# Patient Record
Sex: MALE | Race: WHITE | URBAN - METROPOLITAN AREA
[De-identification: names, ages, dates, MRNs, and addresses within clinical notes are randomized per-mention and may not be internally consistent; named-entity substitution may affect disease eponyms.]

---

## 2023-08-21 ENCOUNTER — OFFICE VISIT (OUTPATIENT)
Dept: FAMILY MEDICINE CLINIC | Facility: CLINIC | Age: 33
End: 2023-08-21
Payer: COMMERCIAL

## 2023-08-21 VITALS
RESPIRATION RATE: 18 BRPM | HEIGHT: 74 IN | DIASTOLIC BLOOD PRESSURE: 86 MMHG | WEIGHT: 315 LBS | OXYGEN SATURATION: 98 % | HEART RATE: 93 BPM | SYSTOLIC BLOOD PRESSURE: 146 MMHG | BODY MASS INDEX: 40.43 KG/M2

## 2023-08-21 DIAGNOSIS — F90.2 ATTENTION DEFICIT HYPERACTIVITY DISORDER (ADHD), COMBINED TYPE: ICD-10-CM

## 2023-08-21 DIAGNOSIS — E66.01 MORBID OBESITY (HCC): ICD-10-CM

## 2023-08-21 DIAGNOSIS — I10 PRIMARY HYPERTENSION: Primary | ICD-10-CM

## 2023-08-21 DIAGNOSIS — Z23 NEED FOR DIPHTHERIA-TETANUS-PERTUSSIS (TDAP) VACCINE: ICD-10-CM

## 2023-08-21 DIAGNOSIS — F33.40 RECURRENT MAJOR DEPRESSIVE DISORDER, IN REMISSION (HCC): ICD-10-CM

## 2023-08-21 DIAGNOSIS — G47.33 OSA (OBSTRUCTIVE SLEEP APNEA): ICD-10-CM

## 2023-08-21 DIAGNOSIS — Z00.00 LABORATORY EXAMINATION ORDERED AS PART OF A ROUTINE GENERAL MEDICAL EXAMINATION: ICD-10-CM

## 2023-08-21 DIAGNOSIS — F41.9 ANXIETY: ICD-10-CM

## 2023-08-21 DIAGNOSIS — J45.20 MILD INTERMITTENT ASTHMA WITHOUT COMPLICATION: ICD-10-CM

## 2023-08-21 PROCEDURE — 3008F BODY MASS INDEX DOCD: CPT | Performed by: FAMILY MEDICINE

## 2023-08-21 PROCEDURE — 3077F SYST BP >= 140 MM HG: CPT | Performed by: FAMILY MEDICINE

## 2023-08-21 PROCEDURE — 90471 IMMUNIZATION ADMIN: CPT | Performed by: FAMILY MEDICINE

## 2023-08-21 PROCEDURE — 90715 TDAP VACCINE 7 YRS/> IM: CPT | Performed by: FAMILY MEDICINE

## 2023-08-21 PROCEDURE — 3079F DIAST BP 80-89 MM HG: CPT | Performed by: FAMILY MEDICINE

## 2023-08-21 PROCEDURE — 99203 OFFICE O/P NEW LOW 30 MIN: CPT | Performed by: FAMILY MEDICINE

## 2023-08-21 RX ORDER — METHYLPHENIDATE HYDROCHLORIDE 20 MG/1
1 CAPSULE, EXTENDED RELEASE ORAL DAILY
COMMUNITY

## 2023-08-21 RX ORDER — NAPROXEN 500 MG/1
500 TABLET ORAL AS NEEDED
COMMUNITY
Start: 2023-02-07

## 2023-08-21 RX ORDER — BUPROPION HYDROCHLORIDE 200 MG/1
200 TABLET, EXTENDED RELEASE ORAL 2 TIMES DAILY
COMMUNITY

## 2023-08-21 RX ORDER — ASCORBIC ACID 125 MG
125 TABLET,CHEWABLE ORAL NIGHTLY
COMMUNITY
Start: 2021-12-01

## 2023-08-21 RX ORDER — HYDROXYZINE HYDROCHLORIDE 25 MG/1
25 TABLET, FILM COATED ORAL AS NEEDED
COMMUNITY

## 2023-08-21 RX ORDER — LORATADINE 10 MG/1
10 TABLET ORAL DAILY
COMMUNITY

## 2023-08-21 RX ORDER — ARIPIPRAZOLE 2 MG/1
2 TABLET ORAL DAILY
COMMUNITY
Start: 2023-06-01

## 2023-08-21 RX ORDER — ELECTROLYTES/DEXTROSE
1 SOLUTION, ORAL ORAL DAILY
COMMUNITY

## 2023-08-21 RX ORDER — BUSPIRONE HYDROCHLORIDE 10 MG/1
2 TABLET ORAL 3 TIMES DAILY
COMMUNITY
Start: 2023-06-09 | End: 2023-08-22

## 2023-08-21 RX ORDER — ALBUTEROL SULFATE 90 UG/1
2 AEROSOL, METERED RESPIRATORY (INHALATION) EVERY 4 HOURS PRN
COMMUNITY
Start: 2022-12-17

## 2023-08-21 RX ORDER — HYDROCHLOROTHIAZIDE 25 MG/1
25 TABLET ORAL DAILY
COMMUNITY

## 2023-09-14 ENCOUNTER — LAB ENCOUNTER (OUTPATIENT)
Dept: LAB | Age: 33
End: 2023-09-14
Attending: FAMILY MEDICINE
Payer: COMMERCIAL

## 2023-09-14 DIAGNOSIS — E66.01 MORBID OBESITY (HCC): ICD-10-CM

## 2023-09-14 DIAGNOSIS — Z00.00 LABORATORY EXAMINATION ORDERED AS PART OF A ROUTINE GENERAL MEDICAL EXAMINATION: ICD-10-CM

## 2023-09-14 LAB
ALBUMIN SERPL-MCNC: 3.4 G/DL (ref 3.4–5)
ALBUMIN/GLOB SERPL: 0.9 {RATIO} (ref 1–2)
ALP LIVER SERPL-CCNC: 53 U/L
ALT SERPL-CCNC: 97 U/L
ANION GAP SERPL CALC-SCNC: 8 MMOL/L (ref 0–18)
AST SERPL-CCNC: 42 U/L (ref 15–37)
BASOPHILS # BLD AUTO: 0.06 X10(3) UL (ref 0–0.2)
BASOPHILS NFR BLD AUTO: 1.1 %
BILIRUB SERPL-MCNC: 0.7 MG/DL (ref 0.1–2)
BUN BLD-MCNC: 19 MG/DL (ref 7–18)
CALCIUM BLD-MCNC: 9.2 MG/DL (ref 8.5–10.1)
CHLORIDE SERPL-SCNC: 106 MMOL/L (ref 98–112)
CHOLEST SERPL-MCNC: 201 MG/DL (ref ?–200)
CO2 SERPL-SCNC: 26 MMOL/L (ref 21–32)
CREAT BLD-MCNC: 1.22 MG/DL
EGFRCR SERPLBLD CKD-EPI 2021: 80 ML/MIN/1.73M2 (ref 60–?)
EOSINOPHIL # BLD AUTO: 0.25 X10(3) UL (ref 0–0.7)
EOSINOPHIL NFR BLD AUTO: 4.7 %
ERYTHROCYTE [DISTWIDTH] IN BLOOD BY AUTOMATED COUNT: 13.3 %
EST. AVERAGE GLUCOSE BLD GHB EST-MCNC: 105 MG/DL (ref 68–126)
FASTING PATIENT LIPID ANSWER: YES
FASTING STATUS PATIENT QL REPORTED: YES
GLOBULIN PLAS-MCNC: 4 G/DL (ref 2.8–4.4)
GLUCOSE BLD-MCNC: 100 MG/DL (ref 70–99)
HBA1C MFR BLD: 5.3 % (ref ?–5.7)
HCT VFR BLD AUTO: 46.3 %
HDLC SERPL-MCNC: 42 MG/DL (ref 40–59)
HGB BLD-MCNC: 15.3 G/DL
IMM GRANULOCYTES # BLD AUTO: 0.01 X10(3) UL (ref 0–1)
IMM GRANULOCYTES NFR BLD: 0.2 %
LDLC SERPL CALC-MCNC: 118 MG/DL (ref ?–100)
LYMPHOCYTES # BLD AUTO: 1.3 X10(3) UL (ref 1–4)
LYMPHOCYTES NFR BLD AUTO: 24.4 %
MCH RBC QN AUTO: 30.1 PG (ref 26–34)
MCHC RBC AUTO-ENTMCNC: 33 G/DL (ref 31–37)
MCV RBC AUTO: 91 FL
MONOCYTES # BLD AUTO: 0.43 X10(3) UL (ref 0.1–1)
MONOCYTES NFR BLD AUTO: 8.1 %
NEUTROPHILS # BLD AUTO: 3.28 X10 (3) UL (ref 1.5–7.7)
NEUTROPHILS # BLD AUTO: 3.28 X10(3) UL (ref 1.5–7.7)
NEUTROPHILS NFR BLD AUTO: 61.5 %
NONHDLC SERPL-MCNC: 159 MG/DL (ref ?–130)
OSMOLALITY SERPL CALC.SUM OF ELEC: 292 MOSM/KG (ref 275–295)
PLATELET # BLD AUTO: 229 10(3)UL (ref 150–450)
POTASSIUM SERPL-SCNC: 3.7 MMOL/L (ref 3.5–5.1)
PROT SERPL-MCNC: 7.4 G/DL (ref 6.4–8.2)
RBC # BLD AUTO: 5.09 X10(6)UL
SODIUM SERPL-SCNC: 140 MMOL/L (ref 136–145)
TRIGL SERPL-MCNC: 234 MG/DL (ref 30–149)
TSI SER-ACNC: 2.08 MIU/ML (ref 0.36–3.74)
VLDLC SERPL CALC-MCNC: 41 MG/DL (ref 0–30)
WBC # BLD AUTO: 5.3 X10(3) UL (ref 4–11)

## 2023-09-14 PROCEDURE — 36415 COLL VENOUS BLD VENIPUNCTURE: CPT

## 2023-09-14 PROCEDURE — 80053 COMPREHEN METABOLIC PANEL: CPT

## 2023-09-14 PROCEDURE — 80061 LIPID PANEL: CPT

## 2023-09-14 PROCEDURE — 83036 HEMOGLOBIN GLYCOSYLATED A1C: CPT

## 2023-09-14 PROCEDURE — 85025 COMPLETE CBC W/AUTO DIFF WBC: CPT

## 2023-09-14 PROCEDURE — 84443 ASSAY THYROID STIM HORMONE: CPT

## 2023-09-18 ENCOUNTER — OFFICE VISIT (OUTPATIENT)
Dept: FAMILY MEDICINE CLINIC | Facility: CLINIC | Age: 33
End: 2023-09-18
Payer: COMMERCIAL

## 2023-09-18 VITALS
HEIGHT: 74 IN | DIASTOLIC BLOOD PRESSURE: 90 MMHG | RESPIRATION RATE: 20 BRPM | HEART RATE: 102 BPM | OXYGEN SATURATION: 98 % | SYSTOLIC BLOOD PRESSURE: 152 MMHG | WEIGHT: 315 LBS | BODY MASS INDEX: 40.43 KG/M2

## 2023-09-18 DIAGNOSIS — K76.0 FATTY LIVER DISEASE, NONALCOHOLIC: ICD-10-CM

## 2023-09-18 DIAGNOSIS — Z23 NEED FOR VACCINATION: ICD-10-CM

## 2023-09-18 DIAGNOSIS — I10 ESSENTIAL HYPERTENSION: Primary | ICD-10-CM

## 2023-09-18 DIAGNOSIS — E78.2 MIXED HYPERLIPIDEMIA: ICD-10-CM

## 2023-09-18 PROCEDURE — 99214 OFFICE O/P EST MOD 30 MIN: CPT | Performed by: FAMILY MEDICINE

## 2023-09-18 PROCEDURE — 90471 IMMUNIZATION ADMIN: CPT | Performed by: FAMILY MEDICINE

## 2023-09-18 PROCEDURE — 90686 IIV4 VACC NO PRSV 0.5 ML IM: CPT | Performed by: FAMILY MEDICINE

## 2023-09-18 PROCEDURE — 3080F DIAST BP >= 90 MM HG: CPT | Performed by: FAMILY MEDICINE

## 2023-09-18 PROCEDURE — 3077F SYST BP >= 140 MM HG: CPT | Performed by: FAMILY MEDICINE

## 2023-09-18 PROCEDURE — 3008F BODY MASS INDEX DOCD: CPT | Performed by: FAMILY MEDICINE

## 2023-09-18 RX ORDER — LISINOPRIL AND HYDROCHLOROTHIAZIDE 12.5; 1 MG/1; MG/1
1 TABLET ORAL DAILY
Qty: 30 TABLET | Refills: 2 | Status: SHIPPED | OUTPATIENT
Start: 2023-09-18

## 2023-09-20 RX ORDER — BUPROPION HYDROCHLORIDE 200 MG/1
200 TABLET, EXTENDED RELEASE ORAL 2 TIMES DAILY
Qty: 60 TABLET | Refills: 5 | Status: SHIPPED | OUTPATIENT
Start: 2023-09-20

## 2023-09-28 ENCOUNTER — OFFICE VISIT (OUTPATIENT)
Dept: INTERNAL MEDICINE CLINIC | Facility: CLINIC | Age: 33
End: 2023-09-28
Payer: COMMERCIAL

## 2023-09-28 DIAGNOSIS — F41.9 ANXIETY AND DEPRESSION: ICD-10-CM

## 2023-09-28 DIAGNOSIS — G47.33 OSA (OBSTRUCTIVE SLEEP APNEA): ICD-10-CM

## 2023-09-28 DIAGNOSIS — E66.01 CLASS 3 SEVERE OBESITY WITH SERIOUS COMORBIDITY AND BODY MASS INDEX (BMI) OF 50.0 TO 59.9 IN ADULT, UNSPECIFIED OBESITY TYPE (HCC): ICD-10-CM

## 2023-09-28 DIAGNOSIS — R63.2 BINGE EATING: ICD-10-CM

## 2023-09-28 DIAGNOSIS — I10 PRIMARY HYPERTENSION: ICD-10-CM

## 2023-09-28 DIAGNOSIS — E88.819 INSULIN RESISTANCE: ICD-10-CM

## 2023-09-28 DIAGNOSIS — F32.A ANXIETY AND DEPRESSION: ICD-10-CM

## 2023-09-28 DIAGNOSIS — Z51.81 ENCOUNTER FOR THERAPEUTIC DRUG MONITORING: Primary | ICD-10-CM

## 2023-09-28 PROCEDURE — 3008F BODY MASS INDEX DOCD: CPT | Performed by: NURSE PRACTITIONER

## 2023-09-28 PROCEDURE — 99204 OFFICE O/P NEW MOD 45 MIN: CPT | Performed by: NURSE PRACTITIONER

## 2023-09-28 PROCEDURE — 3080F DIAST BP >= 90 MM HG: CPT | Performed by: NURSE PRACTITIONER

## 2023-09-28 PROCEDURE — 3077F SYST BP >= 140 MM HG: CPT | Performed by: NURSE PRACTITIONER

## 2023-09-28 RX ORDER — NALTREXONE HYDROCHLORIDE 50 MG/1
25 TABLET, FILM COATED ORAL
Qty: 15 TABLET | Refills: 2 | Status: SHIPPED | OUTPATIENT
Start: 2023-09-28

## 2023-09-28 RX ORDER — METFORMIN HYDROCHLORIDE 750 MG/1
1500 TABLET, EXTENDED RELEASE ORAL
Qty: 60 TABLET | Refills: 2 | Status: SHIPPED | OUTPATIENT
Start: 2023-09-28

## 2023-09-30 VITALS
SYSTOLIC BLOOD PRESSURE: 142 MMHG | HEART RATE: 90 BPM | DIASTOLIC BLOOD PRESSURE: 90 MMHG | WEIGHT: 315 LBS | BODY MASS INDEX: 40 KG/M2 | HEIGHT: 74.5 IN | RESPIRATION RATE: 20 BRPM

## 2023-09-30 PROBLEM — E66.813 CLASS 3 SEVERE OBESITY WITH SERIOUS COMORBIDITY AND BODY MASS INDEX (BMI) OF 50.0 TO 59.9 IN ADULT: Status: ACTIVE | Noted: 2023-09-30

## 2023-09-30 PROBLEM — E66.813 CLASS 3 SEVERE OBESITY WITH SERIOUS COMORBIDITY AND BODY MASS INDEX (BMI) OF 50.0 TO 59.9 IN ADULT (HCC): Status: ACTIVE | Noted: 2023-09-30

## 2023-09-30 PROBLEM — E66.01 CLASS 3 SEVERE OBESITY WITH SERIOUS COMORBIDITY AND BODY MASS INDEX (BMI) OF 50.0 TO 59.9 IN ADULT (HCC): Status: ACTIVE | Noted: 2023-09-30

## 2023-09-30 PROBLEM — Z51.81 ENCOUNTER FOR THERAPEUTIC DRUG MONITORING: Status: ACTIVE | Noted: 2023-09-30

## 2023-10-12 ENCOUNTER — OFFICE VISIT (OUTPATIENT)
Dept: INTERNAL MEDICINE CLINIC | Facility: CLINIC | Age: 33
End: 2023-10-12
Payer: COMMERCIAL

## 2023-10-12 DIAGNOSIS — F41.9 ANXIETY AND DEPRESSION: ICD-10-CM

## 2023-10-12 DIAGNOSIS — G47.33 OSA (OBSTRUCTIVE SLEEP APNEA): ICD-10-CM

## 2023-10-12 DIAGNOSIS — I10 PRIMARY HYPERTENSION: ICD-10-CM

## 2023-10-12 DIAGNOSIS — F32.A ANXIETY AND DEPRESSION: ICD-10-CM

## 2023-10-12 DIAGNOSIS — E66.01 CLASS 3 SEVERE OBESITY WITH SERIOUS COMORBIDITY AND BODY MASS INDEX (BMI) OF 50.0 TO 59.9 IN ADULT, UNSPECIFIED OBESITY TYPE (HCC): Primary | ICD-10-CM

## 2023-10-12 DIAGNOSIS — E88.819 INSULIN RESISTANCE: ICD-10-CM

## 2023-10-12 DIAGNOSIS — R63.2 BINGE EATING: ICD-10-CM

## 2023-10-12 PROCEDURE — 97802 MEDICAL NUTRITION INDIV IN: CPT

## 2023-10-16 ENCOUNTER — LAB ENCOUNTER (OUTPATIENT)
Dept: LAB | Age: 33
End: 2023-10-16
Attending: NURSE PRACTITIONER
Payer: COMMERCIAL

## 2023-10-16 ENCOUNTER — NURSE ONLY (OUTPATIENT)
Dept: FAMILY MEDICINE CLINIC | Facility: CLINIC | Age: 33
End: 2023-10-16
Payer: COMMERCIAL

## 2023-10-16 VITALS — SYSTOLIC BLOOD PRESSURE: 140 MMHG | DIASTOLIC BLOOD PRESSURE: 88 MMHG

## 2023-10-16 DIAGNOSIS — F41.9 ANXIETY AND DEPRESSION: ICD-10-CM

## 2023-10-16 DIAGNOSIS — I10 ESSENTIAL HYPERTENSION: Primary | ICD-10-CM

## 2023-10-16 DIAGNOSIS — R63.2 BINGE EATING: ICD-10-CM

## 2023-10-16 DIAGNOSIS — Z51.81 ENCOUNTER FOR THERAPEUTIC DRUG MONITORING: ICD-10-CM

## 2023-10-16 DIAGNOSIS — F32.A ANXIETY AND DEPRESSION: ICD-10-CM

## 2023-10-16 DIAGNOSIS — E88.819 INSULIN RESISTANCE: ICD-10-CM

## 2023-10-16 DIAGNOSIS — E66.01 CLASS 3 SEVERE OBESITY WITH SERIOUS COMORBIDITY AND BODY MASS INDEX (BMI) OF 50.0 TO 59.9 IN ADULT, UNSPECIFIED OBESITY TYPE (HCC): ICD-10-CM

## 2023-10-16 DIAGNOSIS — G47.33 OSA (OBSTRUCTIVE SLEEP APNEA): ICD-10-CM

## 2023-10-16 DIAGNOSIS — I10 PRIMARY HYPERTENSION: ICD-10-CM

## 2023-10-16 LAB
VIT B12 SERPL-MCNC: 493 PG/ML (ref 193–986)
VIT D+METAB SERPL-MCNC: 31.7 NG/ML (ref 30–100)

## 2023-10-16 PROCEDURE — 82607 VITAMIN B-12: CPT

## 2023-10-16 PROCEDURE — 36415 COLL VENOUS BLD VENIPUNCTURE: CPT

## 2023-10-16 PROCEDURE — 82306 VITAMIN D 25 HYDROXY: CPT

## 2023-10-16 RX ORDER — LISINOPRIL AND HYDROCHLOROTHIAZIDE 20; 12.5 MG/1; MG/1
1 TABLET ORAL DAILY
Qty: 30 TABLET | Refills: 2 | Status: SHIPPED | OUTPATIENT
Start: 2023-10-16

## 2023-10-16 NOTE — PROGRESS NOTES
Presents for nurse visit BP recheck. BP not at goal, continues to run high at home with elevated readings 140s/80s. Will titrate up anithypertensive. F/u 1mo.

## 2023-11-15 ENCOUNTER — OFFICE VISIT (OUTPATIENT)
Dept: FAMILY MEDICINE CLINIC | Facility: CLINIC | Age: 33
End: 2023-11-15
Payer: COMMERCIAL

## 2023-11-15 VITALS
RESPIRATION RATE: 18 BRPM | DIASTOLIC BLOOD PRESSURE: 84 MMHG | HEART RATE: 94 BPM | BODY MASS INDEX: 40 KG/M2 | SYSTOLIC BLOOD PRESSURE: 134 MMHG | OXYGEN SATURATION: 95 % | WEIGHT: 315 LBS | HEIGHT: 74.5 IN

## 2023-11-15 DIAGNOSIS — E66.01 CLASS 3 SEVERE OBESITY DUE TO EXCESS CALORIES WITH SERIOUS COMORBIDITY AND BODY MASS INDEX (BMI) OF 50.0 TO 59.9 IN ADULT (HCC): ICD-10-CM

## 2023-11-15 DIAGNOSIS — G47.33 OSA (OBSTRUCTIVE SLEEP APNEA): ICD-10-CM

## 2023-11-15 DIAGNOSIS — Z23 NEED FOR PNEUMOCOCCAL VACCINATION: ICD-10-CM

## 2023-11-15 DIAGNOSIS — I10 ESSENTIAL HYPERTENSION: ICD-10-CM

## 2023-11-15 DIAGNOSIS — Z00.00 WELLNESS EXAMINATION: Primary | ICD-10-CM

## 2023-11-15 DIAGNOSIS — F41.9 ANXIETY: ICD-10-CM

## 2023-11-15 PROBLEM — R79.89 ELEVATED LFTS: Status: ACTIVE | Noted: 2023-11-15

## 2023-11-15 PROCEDURE — 3079F DIAST BP 80-89 MM HG: CPT | Performed by: FAMILY MEDICINE

## 2023-11-15 PROCEDURE — 3008F BODY MASS INDEX DOCD: CPT | Performed by: FAMILY MEDICINE

## 2023-11-15 PROCEDURE — 90677 PCV20 VACCINE IM: CPT | Performed by: FAMILY MEDICINE

## 2023-11-15 PROCEDURE — 90471 IMMUNIZATION ADMIN: CPT | Performed by: FAMILY MEDICINE

## 2023-11-15 PROCEDURE — 99395 PREV VISIT EST AGE 18-39: CPT | Performed by: FAMILY MEDICINE

## 2023-11-15 PROCEDURE — 3075F SYST BP GE 130 - 139MM HG: CPT | Performed by: FAMILY MEDICINE

## 2023-11-15 RX ORDER — LISINOPRIL AND HYDROCHLOROTHIAZIDE 20; 12.5 MG/1; MG/1
1 TABLET ORAL DAILY
Qty: 90 TABLET | Refills: 1 | Status: SHIPPED | OUTPATIENT
Start: 2023-11-15

## 2024-01-07 DIAGNOSIS — E66.01 CLASS 3 SEVERE OBESITY WITH SERIOUS COMORBIDITY AND BODY MASS INDEX (BMI) OF 50.0 TO 59.9 IN ADULT, UNSPECIFIED OBESITY TYPE (HCC): ICD-10-CM

## 2024-01-07 DIAGNOSIS — Z51.81 ENCOUNTER FOR THERAPEUTIC DRUG MONITORING: ICD-10-CM

## 2024-01-07 RX ORDER — NALTREXONE HYDROCHLORIDE 50 MG/1
25 TABLET, FILM COATED ORAL
Qty: 15 TABLET | Refills: 2 | Status: SHIPPED | OUTPATIENT
Start: 2024-01-07

## 2024-01-07 NOTE — TELEPHONE ENCOUNTER
Requesting Naltrexone  LOV: 9/28/23  RTC: 2 months  Last Relevant Labs: na  Filled: 9/28/23 #15 with 2 refills    Future Appointments   Date Time Provider Department Center   2/19/2024  3:20 PM Monique Nguyen APRN [id number]   5/15/2024  8:20 AM Arben Mckinney MD [facility]

## 2024-02-01 ENCOUNTER — OFFICE VISIT (OUTPATIENT)
Dept: FAMILY MEDICINE CLINIC | Facility: CLINIC | Age: 34
End: 2024-02-01
Payer: COMMERCIAL

## 2024-02-01 VITALS
OXYGEN SATURATION: 98 % | TEMPERATURE: 98 F | WEIGHT: 315 LBS | DIASTOLIC BLOOD PRESSURE: 83 MMHG | RESPIRATION RATE: 20 BRPM | HEART RATE: 97 BPM | HEIGHT: 74.5 IN | SYSTOLIC BLOOD PRESSURE: 128 MMHG | BODY MASS INDEX: 40 KG/M2

## 2024-02-01 DIAGNOSIS — J01.40 ACUTE NON-RECURRENT PANSINUSITIS: Primary | ICD-10-CM

## 2024-02-01 PROCEDURE — 99213 OFFICE O/P EST LOW 20 MIN: CPT | Performed by: NURSE PRACTITIONER

## 2024-02-01 RX ORDER — DOXYCYCLINE HYCLATE 100 MG
100 TABLET ORAL 2 TIMES DAILY
Qty: 14 TABLET | Refills: 0 | Status: SHIPPED | OUTPATIENT
Start: 2024-02-01 | End: 2024-02-08

## 2024-02-01 NOTE — PROGRESS NOTES
CHIEF COMPLAINT:     Chief Complaint   Patient presents with    Cold     Sore throat, congestion, body aches, fatigue, sinus pressure and pain, x10 days        HPI:   Lakhwinder Ma is a 33 year old male who presents for cold symptoms for  10  days. Symptoms have progressed into sinus congestion and been worsening since onset. Sinus congestion/pain is described as a pressure and is located mainly in sinuses.  Patient also reports sore throat, congestion, sinus pain, prior history of sinusitis. denies dental pain. Has treated symptoms with Mucinex.       Current Outpatient Medications   Medication Sig Dispense Refill    Doxycycline Hyclate 100 MG Oral Tab Take 1 tablet (100 mg total) by mouth 2 (two) times daily for 7 days. 14 tablet 0    naltrexone 50 MG Oral Tab Take 0.5 tablets (25 mg total) by mouth daily with food. For cravings 15 tablet 2    Omega-3 Fatty Acids (FISH OIL OR) Take 1 capsule by mouth daily.      lisinopril-hydroCHLOROthiazide 20-12.5 MG Oral Tab Take 1 tablet by mouth daily. 90 tablet 1    buPROPion HCl ER, SR, 200 MG Oral Tablet 12 Hr Take 1 tablet (200 mg total) by mouth 2 (two) times daily. 60 tablet 5    busPIRone 10 MG Oral Tab Take 2 tablets (20 mg total) by mouth 3 (three) times daily. 180 tablet 5    ARIPiprazole 2 MG Oral Tab Take 1 tablet (2 mg total) by mouth daily.      hydrOXYzine 25 MG Oral Tab Take 1 tablet (25 mg total) by mouth as needed for Anxiety.      Magnesium Citrate 125 MG Oral Cap Take 1 capsule by mouth nightly.      naproxen 500 MG Oral Tab Take 1 tablet (500 mg total) by mouth as needed.      albuterol 108 (90 Base) MCG/ACT Inhalation Aero Soln Inhale 2 puffs into the lungs every 4 (four) hours as needed for Wheezing or Shortness of Breath.      loratadine 10 MG Oral Tab Take 1 tablet (10 mg total) by mouth daily.      Multiple Vitamin (MULTIVITAMIN ADULT) Oral Tab Take 1 tablet by mouth daily.        Past Medical History:   Diagnosis Date    Anxiety     Asthma      Depression     Hypertension     Restless leg syndrome       No past surgical history on file.   Family History   Problem Relation Age of Onset    Hypertension Father     Hypertension Brother       Social History     Socioeconomic History    Marital status:    Tobacco Use    Smoking status: Never    Smokeless tobacco: Never   Vaping Use    Vaping Use: Never used   Substance and Sexual Activity    Alcohol use: Yes     Comment: rare    Drug use: Never         REVIEW OF SYSTEMS:   GENERAL:  denies  diminished appetite  SKIN: no rashes or abnormal skin lesions  HEENT: See HPI.    LUNGS: denies shortness of breath or wheezing, See HPI  CARDIOVASCULAR: denies chest pain or palpitations   GI: denies N/V/C or abdominal pain  NEURO: + sinus headaches.  No numbness or tingling in face.    EXAM:   /83   Pulse 97   Temp 98.2 °F (36.8 °C) (Oral)   Resp 20   Ht 6' 2.5\" (1.892 m)   Wt (!) 387 lb (175.5 kg)   SpO2 98%   BMI 49.02 kg/m²   GENERAL: well developed, well nourished,in no apparent distress  SKIN: no rashes,no suspicious lesions  HEAD: atraumatic, normocephalic, + tenderness on palpation of sinuses  EYES: conjunctiva clear, EOM intact  EARS: TM's without erythema, no bulging, no retraction, no fluid, bony landmarks visible  NOSE: nostrils patent, clear nasal mucous, nasal mucosa reddened and inflamed  THROAT: oral mucosa pink, moist. No visible dental caries. Posterior pharynx is not erythematous.  NECK: supple, non-tender  LUNGS: Breathing is non labored. Lungs clear to auscultation bilaterally, no wheezes or rhonchi.   CARDIO: RRR without murmur  EXTREMITIES: no cyanosis, clubbing or edema  LYMPH:  no cervical lymphadenopathy.        ASSESSMENT AND PLAN:   Lakhwinder Ma is a 33 year old male who presents with URI symptoms that are consistent with:      ASSESSMENT:  Encounter Diagnosis   Name Primary?    Acute non-recurrent pansinusitis Yes         PLAN: Meds as below.  Comfort care instructions as  listed in Patient Instructions    Meds & Refills for this Visit:  Requested Prescriptions     Signed Prescriptions Disp Refills    Doxycycline Hyclate 100 MG Oral Tab 14 tablet 0     Sig: Take 1 tablet (100 mg total) by mouth 2 (two) times daily for 7 days.       Risks, benefits, side effects of medication addressed and explained.    Patient Instructions   I recommend the 4 H's for inflammation:    1. Heat (warm mist from the shower or warm liquids such as tea)  2. Honey (mixed in your tea or by the spoonful [if you are not diabetic; over the age of 1 year]--take a spoonful 3 times a day and don't eat or drink anything for 15-20 minutes)  3. Humidity--cool mist in the bedroom at night  4. Hydration --at least 8 -10 glasses a day     Flonase daily  Benadryl at night    The patient indicates understanding of these issues and agrees to the plan.  The patient is asked to f/u with PCP if sx's persist or worsen.

## 2024-02-05 RX ORDER — BUPROPION HYDROCHLORIDE 200 MG/1
200 TABLET, EXTENDED RELEASE ORAL 2 TIMES DAILY
Qty: 90 TABLET | Refills: 1 | Status: SHIPPED | OUTPATIENT
Start: 2024-02-05

## 2024-02-05 NOTE — TELEPHONE ENCOUNTER
LF 9/20/2023 with 60 tabs + 5 refills   LOV 11/15/2023  RTC 6 months     Pt requesting 90 day supply per insurance coverage, changed to 90+ 1

## 2024-02-18 NOTE — PROGRESS NOTES
Lakhwinder Ma is a 33 year old male presents today for follow-up on medical weight loss program for the treatment of overweight, obesity, or morbid obesity with associated HTN, DAISHA.    S:  Current weight   Wt Readings from Last 6 Encounters:   02/19/24 (!) 395 lb (179.2 kg)   02/01/24 (!) 387 lb (175.5 kg)   11/15/23 (!) 395 lb (179.2 kg)   09/28/23 (!) 419 lb (190.1 kg)   09/18/23 (!) 417 lb (189.1 kg)   08/21/23 (!) 406 lb (184.2 kg)    AND BMI Body mass index is 50.04 kg/m²..    Patient has lost 24# since LOV 5 month ago. He has been consistent with Naltrexone but stopped Metformin ER d/t SEs of nausea. Feeling less cravings and binge eating is rare. Has more awareness of food portions. Would like to go up on dose if appropriate d/t a slight decline in this craving control then when he started.    Testing/consult completed since LOV: Dietician: yes, note reviewed in EMR. 10/2023 with Lesvia: Estimated caloric needs for weight loss: ~2700 cals/d for 1-2 pounds/week weight loss.  Labs: yes, reviewed in EMR.    Labs: WNL    [facility] Medical Weight Loss Follow Up    Question 2/18/2024  4:07 PM CST - Filed by Patient   Please describe a success moment: I definitely can tell that the naltrexone helps eith cravings and the urge to graze. I've also tracked my food for 125 days straight   Please describe a challenging moment/needs for improvement: I still have had some moments of binge eating. And I need to focus more on getting my calories at/under my limits   Please complete this 24 hour food journal, listing everything you had to eat in the past day. Include the average time of day you ate these meals at    List foods, qty and prep for breakfast: Two homemade sausage breakfast burritos, 8 AM   List foods, qty and prep for lunch. Homemade Cajun rice & sausage, 2 cups, 12:30   List foods, qty and prep for dinner. Frozen pizza, half   List foods, qty and prep for snacks. Microwave popcorn, cinnamon raisin bagel & nutella    List the types and qty of fluids consumed 2 cans diet Pepsi, 8 cups water   On average, how many meals did you eat out per week? 5   Exercise    How many days per week are you active or exercise 5   On average, how many days were anaerobic (strength/resistance) exercises performed? 0   On average, how many days were aerobic (cardio) exercises performed? 5   Perceived level of exertion on a scale of 1-5, with 5 being very intense: 2   Stress    Average stress level on a scale of 1-10, with 10 being extremely stressed: 7   If greater than 5/1O how would you grade your coping mechanisms? moderate   Sleep hours and integrity    How many hours of uninterrupted sleep do you get a night: 7   Do you feel rested in the morning: Yes   If no, what may have been disrupting your sleep?    Please list any goal(s) for your next visit      Social hx and PMH reviewed. Employed from home.  with school age son.    REVIEW OF SYSTEMS:  GENERAL: feels well otherwise  LUNGS: denies shortness of breath with exertion  CARDIOVASCULAR: denies chest pain on exertion, denies palpitations or pedal edema  GI: denies abdominal pain.  No N/V/D/C  MUSCULOSKELETAL: no acute joint or muscle pain  NEURO: denies headaches  PSYCH: denies change in behavior or mood, denies feeling sad or depressed. Answers submitted by the patient for this visit:  Medical Weight Loss Follow Up (Submitted on 2/18/2024)  If greater than 5/1O how would you grade your coping mechanisms?: moderate      EXAM:  /84   Pulse 88   Resp 16   Ht 6' 2.5\" (1.892 m)   Wt (!) 395 lb (179.2 kg)   BMI 50.04 kg/m²  Baseline BC obtained.  GENERAL: well developed, well nourished, in no apparent distress, morbidly obese  EYES: conjunctiva pink, sclera non icteric, PERRLA  LUNGS: CTA in all fields, breathing non labored  CARDIO: RRR without murmur, normal S1 and S2 without clicks or gallops, no pedal edema.  GI: +BS  NEURO/MS: motor and sensory grossly intact  PSYCH:  pleasant, cooperative, normal mood and affect    ASSESSMENT AND PLAN:  Reviewed Initial Weight Data and Goal Weight Loss:       Encounter Diagnoses   Name Primary?    Encounter for therapeutic drug monitoring Yes    Class 3 severe obesity with serious comorbidity and body mass index (BMI) of 50.0 to 59.9 in adult, unspecified obesity type (HCC)     Primary hypertension     Binge eating     DAISHA on CPAP        No orders of the defined types were placed in this encounter.      Meds & Refills for this Visit:  Requested Prescriptions     Signed Prescriptions Disp Refills    naltrexone 50 MG Oral Tab 90 tablet 0     Sig: Take 1 tablet (50 mg total) by mouth daily with food. For cravings       Imaging & Consults:  None      Plan:  Patient has lost 24# since LOV 5 month ago on Naltrexone 25 mg daily with a total weight loss of 24# since initial consult on 9/28/23 with initial weight of 419#.  Labs reviewed. Weight loss goal: lose 303 in 6 months and 60# in 1 year.  Increase naltrexone. Hx of no AOM coverage in the past. Metformin ER with SEs of nausea.  on Food 4 Fuel and balanced plate eating, meal prep/planning. See patient instructions below for additional plans and patient counseling.      Patient Instructions   Continue making lifestyle changes that focus on good nutrition, regular exercise and stress management.    Medication Plan: Increase Naltrexone to full tab daily. Look into coverage for Zepbound.    Next steps to work on before next office visit include: Reviewed labs, Vitamin D borderline normal. Can add over the counter Vitamin D 1000 international units daily for supplementation.    Re-thinking Nutrition: Learning to See Food as Fuel  October 8, 2019  Posted in Blog, Nutrition  By [employer]    “Dieting” can start to feel challenging when we begin to associate healthy behaviors with “punishment.” Too often, we overlook the real reason we eat food. It's not only meant to be enjoyed,  but also to provide basic fuel for our bodies.  Learning to see food as fuel can help you find balance in your journey with weight. It will teach you about the primal purpose of food, the effect certain foods have on health, and how to listen carefully to what your body is trying to tell you.  It Starts with Cells  Did you know your body has more than 35 trillion cells? Each one serves a purpose.  Once a cell has completed its purpose, it dies. Your body replaces dead and worn-out cells with new and energetic ones. It also depends on this ongoing cell cycle to keep you healthy. And guess what? The foods you eat help shape this process.  Seeing Food as Fuel  Eating the right foods helps build and repair cells to be stronger than before. It does this by getting nutrition from whole grains, vitamins, minerals, fats, protein and other nutrients.  These essential nutrients matter greatly to every single cell in your body. They make up your:  Cell membrane  Nucleus  Mitochondria  When cells join together, they make tissue that brings life to your bones, brain, skin, nerves, muscles, etc. The health and lifespan of your cells depend on the nutrients you get from food. That is why healthy food choices are so important.   Once you can start to see food as being fuel for your body, you will get better at making the healthy choice the easy choice. Food will be less about rewards or punishments and more about supporting your overall health and happiness.  Building Blocks of Good Nutrition  A diet without enough fiber, protein and healthy fats can cause your cells to become brittle, leaky and tired. When cells can't do what they are designed to do, problems like inflammation, cancer and other difficult health conditions start to arise.  Foods that Support Healthy Cells:  Unsaturated Fats - Fish, nuts avocados, olive oil, flax seed, etc.  Protein - Poultry, lean beef, yogurt, eggs, seeds, beans, etc.  Antioxidants - Fruits, dark  green veggies, sweet potatoes, tea, etc.  So, the next time you grab something to eat, ask yourself how that food helps or hurts your body. This is a part of living mindful and being knowledgeable about what you consume regularly.  When you start to see food as fuel, not just a tasty treat or the solution to a bad temper, you will start to make healthier choices more often. Making new habits is one of the building blocks to successful long-term weight management!    Balanced Nutrition includes:     Build the mentality of Food 4 Fuel. Clean eating with whole foods and eliminating/reducing ultra processed foods.  Be an intuitive eater and using mindful eating practices.  Eat a balanced plate with protein and produce at all meals: 1/4 plate- protein, 1/2 plate non starchy veggies, and 1/4 plate fruit or complex carbohydrate.  Drink water with all meals and use a salad plate to naturally reduce portions.  Eliminate/reduce late night eating by stopping after 7pm. Allowing your body to fast for 12 hours (drink only water, tea or black coffee without any additives).              What’s for Lunch? Planning and Prepping the Basics  March 4, 2022  Posted in Blog, Nutrition  By Your [employer]    Whether you work from home or in an office, lunch can be a challenge. Finding time mid-day for a nutrition-packed lunch isn’t easy. Remember, lunch is a time to refuel for the second part of the day. Packing food with a lot of nutrition can make your afternoon more successful! Instead of being pulled to the nearest fast food restaurant, look for new options to add to your day.    Lunch Basics  First, start with these basic tips to eat more power-packed lunches.    Make Your Lunch Balanced  Your lunch should include protein, fruit, vegetables, dairy and whole grains. Adding all the food groups can give you maximum nutrition. Ham and cheese on a whole wheat tortilla with yogurt, apple slices and carrot sticks can be a great  meal. It provides protein, complex carbohydrates and fiber. You could also consider a peanut butter and banana sandwich on whole wheat bread with green peppers and low-fat dip, string cheese and strawberries.    Plan Ahead  There is nothing worse than not having a plan for lunch and resorting to a greasy burger with fries. Spend some prep time to get settled for the week by chopping and bagging vegetables, slicing fruit in containers, and portioning out whole grain crackers. If you prep on Sunday, you can grab and go all week. You could also consider packing the entire lunch the night before.    Cook a Little Extra  Having grilled chicken on Sunday night? Merlin a couple extra chicken breasts to chop up for a salad or put inside a whole grain tortilla. Think of this often. Last night’s dinner can be tomorrow’s lunch! An extra serving of chili or leftover pasta are other great options.    Ideas to Get Started with the Main Dish    Find a thermos and have different sizes of containers on hand. This is a great way to use your leftovers!    Chicken, pork or steak with a side of barbecue sauce  Soups and chili  Last night’s dinner -stir gilbert or casserole (higher caloric density, choose a smaller portion like 1 cup)    Tilden or Wrap: Use low carb or skip the bread all together and use a irving lettuce leaf as your base  Whole grain bread with lunch meat  Whole grain tortilla with peanut butter and banana (or any fruit--try strawberry!)  Cheese quesadilla    Salads to Go:  Lettuce, veggies and protein (use last night’s protein)  Cold pasta salad with grilled chicken  Tuna or chicken salad    Add Sensible Sides:  [place]  Baked chips  Peppers and hummus  Low-fat yogurt  Blueberries  Whole grain crackers  Pasta salad  Dried fruit  Berries and fruit dip  Cheese cubes  Veggies and dip  Avocado slices  Cottage cheese  Last night’s vegetables    What about Lunch Out?  On a busy day, takeout can be an option. You can still make  your health a priority while grabbing takeout or eating out. Check out the menu for nutrition-packed proteins and sides. Choose salads and grilled meat and opt for smaller portions. Many restaurants are adding more and more healthy options as time goes on, so making healthy choices keeps getting easier.    Take it one step at a time on your way to a healthy lunch! Every bite counts.    For practical tips on meal prep, planning, shopping and dining please watch the [employer] video presented at the [id number] conference by [employer] with the following link:    https://www.obesityaction.org/oac-videos/planning-shopping-and-dining-practical-tactics-for-good-nutrition/#:~:text=Planning%2C%20Shopping%20and%20Dining%3A%20Practical%20Tactics%20for%20Good%20Nutrition      Medication use and SEs reviewed with patient.    Return in about 3 months (around 5/19/2024) for weight management via clinic or VV.    Patient verbalizes understanding.    DOCUMENTATION OF TIME SPENT: Code selection for this visit was based on time spent : 20 minutes on date of service in preparing to see the patient, obtaining and/or reviewing separately obtained history, performing a medically appropriate examination, counseling and educating the patient/family/caregiver, ordering medications or testing, referring and communicating with other healthcare providers, documenting clinical information in the electronic medical record, independently interpreting results and communicating results to the patient/family/caregiver and care coordination with the patient's other providers.

## 2024-02-19 ENCOUNTER — OFFICE VISIT (OUTPATIENT)
Dept: INTERNAL MEDICINE CLINIC | Facility: CLINIC | Age: 34
End: 2024-02-19
Payer: COMMERCIAL

## 2024-02-19 VITALS
HEART RATE: 88 BPM | HEIGHT: 74.5 IN | RESPIRATION RATE: 16 BRPM | WEIGHT: 315 LBS | SYSTOLIC BLOOD PRESSURE: 134 MMHG | DIASTOLIC BLOOD PRESSURE: 84 MMHG | BODY MASS INDEX: 40 KG/M2

## 2024-02-19 DIAGNOSIS — E66.01 CLASS 3 SEVERE OBESITY WITH SERIOUS COMORBIDITY AND BODY MASS INDEX (BMI) OF 50.0 TO 59.9 IN ADULT, UNSPECIFIED OBESITY TYPE (HCC): ICD-10-CM

## 2024-02-19 DIAGNOSIS — Z51.81 ENCOUNTER FOR THERAPEUTIC DRUG MONITORING: Primary | ICD-10-CM

## 2024-02-19 DIAGNOSIS — I10 PRIMARY HYPERTENSION: ICD-10-CM

## 2024-02-19 DIAGNOSIS — G47.33 OSA ON CPAP: ICD-10-CM

## 2024-02-19 DIAGNOSIS — R63.2 BINGE EATING: ICD-10-CM

## 2024-02-19 PROCEDURE — 99213 OFFICE O/P EST LOW 20 MIN: CPT | Performed by: NURSE PRACTITIONER

## 2024-02-19 RX ORDER — NALTREXONE HYDROCHLORIDE 50 MG/1
50 TABLET, FILM COATED ORAL
Qty: 90 TABLET | Refills: 0 | Status: SHIPPED | OUTPATIENT
Start: 2024-02-19

## 2024-02-19 NOTE — PATIENT INSTRUCTIONS
Continue making lifestyle changes that focus on good nutrition, regular exercise and stress management.    Medication Plan: Increase Naltrexone to full tab daily. Look into coverage for Zepbound.    Next steps to work on before next office visit include: Reviewed labs, Vitamin D borderline normal. Can add over the counter Vitamin D 1000 international units daily for supplementation.    Re-thinking Nutrition: Learning to See Food as Fuel  October 8, 2019  Posted in Blog, Nutrition  [employer]    “Dieting” can start to feel challenging when we begin to associate healthy behaviors with “punishment.” Too often, we overlook the real reason we eat food. It's not only meant to be enjoyed, but also to provide basic fuel for our bodies.  Learning to see food as fuel can help you find balance in your journey with weight. It will teach you about the primal purpose of food, the effect certain foods have on health, and how to listen carefully to what your body is trying to tell you.  It Starts with Cells  Did you know your body has more than 35 trillion cells? Each one serves a purpose.  Once a cell has completed its purpose, it dies. Your body replaces dead and worn-out cells with new and energetic ones. It also depends on this ongoing cell cycle to keep you healthy. And guess what? The foods you eat help shape this process.  Seeing Food as Fuel  Eating the right foods helps build and repair cells to be stronger than before. It does this by getting nutrition from whole grains, vitamins, minerals, fats, protein and other nutrients.  These essential nutrients matter greatly to every single cell in your body. They make up your:  Cell membrane  Nucleus  Mitochondria  When cells join together, they make tissue that brings life to your bones, brain, skin, nerves, muscles, etc. The health and lifespan of your cells depend on the nutrients you get from food. That is why healthy food choices are so important.   Once  you can start to see food as being fuel for your body, you will get better at making the healthy choice the easy choice. Food will be less about rewards or punishments and more about supporting your overall health and happiness.  Building Blocks of Good Nutrition  A diet without enough fiber, protein and healthy fats can cause your cells to become brittle, leaky and tired. When cells can't do what they are designed to do, problems like inflammation, cancer and other difficult health conditions start to arise.  Foods that Support Healthy Cells:  Unsaturated Fats - Fish, nuts avocados, olive oil, flax seed, etc.  Protein - Poultry, lean beef, yogurt, eggs, seeds, beans, etc.  Antioxidants - Fruits, dark green veggies, sweet potatoes, tea, etc.  So, the next time you grab something to eat, ask yourself how that food helps or hurts your body. This is a part of living mindful and being knowledgeable about what you consume regularly.  When you start to see food as fuel, not just a tasty treat or the solution to a bad temper, you will start to make healthier choices more often. Making new habits is one of the building blocks to successful long-term weight management!    Balanced Nutrition includes:     Build the mentality of Food 4 Fuel. Clean eating with whole foods and eliminating/reducing ultra processed foods.  Be an intuitive eater and using mindful eating practices.  Eat a balanced plate with protein and produce at all meals: 1/4 plate- protein, 1/2 plate non starchy veggies, and 1/4 plate fruit or complex carbohydrate.  Drink water with all meals and use a salad plate to naturally reduce portions.  Eliminate/reduce late night eating by stopping after 7pm. Allowing your body to fast for 12 hours (drink only water, tea or black coffee without any additives).              What’s for Lunch? Planning and Prepping the Basics  March 4, 2022  Posted in Blog, Nutrition  [employer]    Whether you work  from home or in an office, lunch can be a challenge. Finding time mid-day for a nutrition-packed lunch isn’t easy. Remember, lunch is a time to refuel for the second part of the day. Packing food with a lot of nutrition can make your afternoon more successful! Instead of being pulled to the nearest fast food restaurant, look for new options to add to your day.    Lunch Basics  First, start with these basic tips to eat more power-packed lunches.    Make Your Lunch Balanced  Your lunch should include protein, fruit, vegetables, dairy and whole grains. Adding all the food groups can give you maximum nutrition. Ham and cheese on a whole wheat tortilla with yogurt, apple slices and carrot sticks can be a great meal. It provides protein, complex carbohydrates and fiber. You could also consider a peanut butter and banana sandwich on whole wheat bread with green peppers and low-fat dip, string cheese and strawberries.    Plan Ahead  There is nothing worse than not having a plan for lunch and resorting to a greasy burger with fries. Spend some prep time to get settled for the week by chopping and bagging vegetables, slicing fruit in containers, and portioning out whole grain crackers. If you prep on Sunday, you can grab and go all week. You could also consider packing the entire lunch the night before.    Cook a Little Extra  Having grilled chicken on Sunday night? [place] a couple extra chicken breasts to chop up for a salad or put inside a whole grain tortilla. Think of this often. Last night’s dinner can be tomorrow’s lunch! An extra serving of chili or leftover pasta are other great options.    Ideas to Get Started with the Main Dish    Find a thermos and have different sizes of containers on hand. This is a great way to use your leftovers!    Chicken, pork or steak with a side of barbecue sauce  Soups and chili  Last night’s dinner -stir gilbert or casserole (higher caloric density, choose a smaller portion like 1  cup)    [place] or Wrap: Use low carb or skip the bread all together and use a [place] lettuce leaf as your base  Whole grain bread with lunch meat  Whole grain tortilla with peanut butter and banana (or any fruit--try strawberry!)  Cheese quesadilla    Salads to Go:  Lettuce, veggies and protein (use last night’s protein)  Cold pasta salad with grilled chicken  Tuna or chicken salad    Add Sensible Sides:  [place]  Baked chips  Peppers and hummus  Low-fat yogurt  Blueberries  Whole grain crackers  Pasta salad  Dried fruit  Berries and fruit dip  Cheese cubes  Veggies and dip  Avocado slices  Cottage cheese  Last night’s vegetables    What about Lunch Out?  On a busy day, takeout can be an option. You can still make your health a priority while grabbing takeout or eating out. Check out the menu for nutrition-packed proteins and sides. Choose salads and grilled meat and opt for smaller portions. Many restaurants are adding more and more healthy options as time goes on, so making healthy choices keeps getting easier.    Take it one step at a time on your way to a healthy lunch! Every bite counts.    For practical tips on meal prep, planning, shopping and dining please watch the [employer] video presented at the [id number] conference by [employer] with the following link:    https://www.obesityaction.org/oac-videos/planning-shopping-and-dining-practical-tactics-[email or web address]-nutrition/#:~:text=Planning%[email or web address]%20Shopping%[email or web address]%20Dining%3A%20Practical%20Tactics%20for%20Good%20Nutrition

## 2024-03-19 RX ORDER — BUSPIRONE HYDROCHLORIDE 10 MG/1
20 TABLET ORAL 3 TIMES DAILY
Qty: 180 TABLET | Refills: 5 | OUTPATIENT
Start: 2024-03-19

## 2024-03-20 RX ORDER — BUSPIRONE HYDROCHLORIDE 10 MG/1
20 TABLET ORAL 3 TIMES DAILY
Qty: 180 TABLET | Refills: 5 | Status: SHIPPED | OUTPATIENT
Start: 2024-03-20

## 2024-04-30 RX ORDER — BUPROPION HYDROCHLORIDE 200 MG/1
200 TABLET, EXTENDED RELEASE ORAL 2 TIMES DAILY
Qty: 90 TABLET | Refills: 1 | OUTPATIENT
Start: 2024-04-30

## 2024-05-14 ENCOUNTER — TELEMEDICINE (OUTPATIENT)
Dept: INTERNAL MEDICINE CLINIC | Facility: CLINIC | Age: 34
End: 2024-05-14

## 2024-05-14 DIAGNOSIS — R63.8 CRAVING FOR PARTICULAR FOOD: ICD-10-CM

## 2024-05-14 DIAGNOSIS — E66.01 CLASS 3 SEVERE OBESITY WITH SERIOUS COMORBIDITY AND BODY MASS INDEX (BMI) OF 50.0 TO 59.9 IN ADULT, UNSPECIFIED OBESITY TYPE (HCC): ICD-10-CM

## 2024-05-14 DIAGNOSIS — I10 PRIMARY HYPERTENSION: Primary | ICD-10-CM

## 2024-05-14 DIAGNOSIS — F32.A ANXIETY AND DEPRESSION: ICD-10-CM

## 2024-05-14 DIAGNOSIS — Z51.81 ENCOUNTER FOR THERAPEUTIC DRUG MONITORING: ICD-10-CM

## 2024-05-14 DIAGNOSIS — F41.9 ANXIETY AND DEPRESSION: ICD-10-CM

## 2024-05-14 PROCEDURE — 99213 OFFICE O/P EST LOW 20 MIN: CPT | Performed by: NURSE PRACTITIONER

## 2024-05-14 RX ORDER — TOPIRAMATE 25 MG/1
25 TABLET ORAL 2 TIMES DAILY
Qty: 60 TABLET | Refills: 2 | Status: SHIPPED | OUTPATIENT
Start: 2024-05-14

## 2024-05-14 RX ORDER — NALTREXONE HYDROCHLORIDE 50 MG/1
50 TABLET, FILM COATED ORAL
Qty: 90 TABLET | Refills: 1 | Status: SHIPPED | OUTPATIENT
Start: 2024-05-14

## 2024-05-14 NOTE — PROGRESS NOTES
[facility] Weight Management follow up via video visit:    Subjective    This visit is conducted using Telemedicine with live, interactive video and audio.    Chief Complaint:  Routine follow up visit for lifestyle and medical management for overweight, obesity, or morbid obesity. LOV in clinic weight: 395#.    PMH reviewed. Bariatric surgery planned or hx of surgery in the past: 5/10 interest.    HPI:   Lakhwinder Ma is a 33 year old male who is being followed up today for lifestyle and medical management as deemed appropriate for overweight, obesity or morbid obesity. Patient reports weight loss monitoring at home via scale with a weight of 395#. This appears to be a weight maintenance since LOV 3 months ago in clinic. Patient has been been consistent with medication and feeling naltrexone has been helping.    Questions/Concerns/Comments since LOV: He met with dietician angelique on 10/12/23, progress note reviewed in EMR. Estimated caloric needs for weight loss: ~2700 cals/d for 1-2 pounds/week weight loss. He was tracking nutrition which he found to be helpful, but not of recent d/t work demands. Mood stable, but reports discussing with PCP this week about buspirone potential SEs.    Lifestyle/Social Hx Reviewed:    [facility] Medical Weight Loss Follow Up    Question 5/13/2024  8:25 AM CDT - Filed by Patient   Please describe a success moment: If I feel full, I don't feel the need to clear my plate and stuff myself   Please describe a challenging moment/needs for improvement: Need to be more consistent with my medication, and get back to consistently tracking my food and exercising   Please complete this 24 hour food journal, listing everything you had to eat in the past day. Include the average time of day you ate these meals at    List foods, qty and prep for breakfast: homemade sausage breakfast burrito on whole wheat tortilla. 8 AM   List foods, qty and prep for lunch. Leftover [employer], three slices pepperoni  12 PM   List foods, qty and prep for dinner. baked chicken, broccoli, rice, homemade   List foods, qty and prep for snacks. builder bar, pretzel 1 cup   List the types and qty of fluids consumed diet pepsi, water   On average, how many meals did you eat out per week? 7   Exercise    How many days per week are you active or exercise 2   On average, how many days were anaerobic (strength/resistance) exercises performed? 0   On average, how many days were aerobic (cardio) exercises performed? 2   Perceived level of exertion on a scale of 1-5, with 5 being very intense: 2   Stress    Average stress level on a scale of 1-10, with 10 being extremely stressed: 7   If greater than 5/1O how would you grade your coping mechanisms? fair   Sleep hours and integrity    How many hours of uninterrupted sleep do you get a night: 8   Do you feel rested in the morning: No   If no, what may have been disrupting your sleep? unsure, getting sleep study done next month       ROS  General: feeling well, denies fatigue  CV: denies cp, palpitations  Resp: denies sob  GI: denies abdominal pain. Denies N/V/D/C.  Neuro: denies paresthesia or cognitive changes  Psych: denies any mood changes    Physical Exam:  >   BP Readings from Last 3 Encounters:   02/19/24 134/84   02/01/24 128/83   11/15/23 134/84       Home weight: see above  Home BP: not available  Home blood sugars: n/a    General: patient speaking in full sentences, no increased work of breathing. alert, appears stated age, cooperative, no distress, and morbidly obese  HENT: normocephalic  Resp: Breathing is non labored  Psych: patient appears cheerful, smiling, making good eye contact    Diagnoses and all orders for this visit:    Primary hypertension    Encounter for therapeutic drug monitoring  -     topiramate 25 MG Oral Tab; Take 1 tablet (25 mg total) by mouth 2 (two) times daily. Start 1 tab daily for 7 days, then can increase to twice a day as prescribed  -     naltrexone 50 MG  Oral Tab; Take 1 tablet (50 mg total) by mouth daily with food. For cravings    Class 3 severe obesity with serious comorbidity and body mass index (BMI) of 50.0 to 59.9 in adult, unspecified obesity type (HCC)  -     topiramate 25 MG Oral Tab; Take 1 tablet (25 mg total) by mouth 2 (two) times daily. Start 1 tab daily for 7 days, then can increase to twice a day as prescribed  -     naltrexone 50 MG Oral Tab; Take 1 tablet (50 mg total) by mouth daily with food. For cravings    Anxiety and depression    Craving for particular food  -     topiramate 25 MG Oral Tab; Take 1 tablet (25 mg total) by mouth 2 (two) times daily. Start 1 tab daily for 7 days, then can increase to twice a day as prescribed  -     naltrexone 50 MG Oral Tab; Take 1 tablet (50 mg total) by mouth daily with food. For cravings        Patient Instructions   Continue making lifestyle changes that focus on good nutrition, regular exercise and stress management.    Medication Plan: Continue Naltrexone. Add Topamax as directed with option to increase after 1 month if needed - can send [employer] message.    Next steps to work on before next office visit include: Reviewed some stress relief measures such as gratitude journal daily, meditation and return to exercise. Tracking nutrition even 2x/week will provide some benefit. Do not hold on to the mentality of all or nothing. Life will never be perfect, strive for progress over perfection.    Why We Gain Weight When We’re Stressed--And How Not To  The psychology and biology of stress-related overeating and weight gain   Emotional Eating under Stress  Have you ever found yourself mindlessly eating a tub of ice cream while you brood about your latest romantic rejection or eating a hamburger and fries in front of your computer as you furiously try to make a work deadline? Perhaps you’re a busy mom, eating cookies in your car as you shuttle the kids back and forth to a slew of activities. Or you’re a small  business owner desperately trying to make ends meet when you suddenly realize your waistline has expanded. If you recognize yourself in any of these scenarios, you’re not alone and it’s probably not your fault. Stress that goes on for a long period is a triple whammy for weight--it increases our appetites, makes us hold onto the fat, and interferes with our willpower to implement a healthy lifestyle.  Below are the four major reasons stress leads to weight gain and four great research-based coping strategies you can use to fight back.  Hormones  When your brain detects the presence of a threat, no matter if it is a snake in the grass, a grumpy boss, or a big credit card bill, it triggers the release of a cascade of chemicals, including adrenaline, CRH, and cortisol. Your brain and body prepare to handle the threat by making you feel alert, ready for action and able to withstand an injury. In the short-term, adrenaline helps you feel less hungry as your blood flows away from the internal organs and to your large muscles to prepare for “fight or flight.” However, once the effects of adrenaline wear off, cortisol, known as the “stress hormone,” hangs around and starts signaling the body to replenish your food supply. Fighting off wild animals, like our ancestors did, used up a lot of energy, so their bodies needed more stores of fat and glucose. Today’s human, who sits on the couch worrying about how to pay the bill or works long hours at the computer to make the deadline, does not work off much energy at all dealing with the stressor! Unfortunately, we are stuck with a neuroendocrine system that didn’t get the update, so your brain is still going to tell you to reach for that plate of cookies anyway.  Belly Fat  In the days when our ancestors were fighting off tigers and famine, their bodies adapted by learning to store fat supplies for the long haul. The unfortunate result for you and me is that when we are  chronically stressed by life crises and work-life demands, we are prone to getting an extra layer of “visceral fat” deep in our bellies. Your belly has an ample supply of blood vessels and cortisol receptors to make the whole process flow more efficiently. The downside is that excess belly fat is unhealthy and difficult to get rid of. The fat releases chemicals triggering inflammation, which increases the likelihood that we will develop heart disease or diabetes. And it can make it more difficult to fit into those massimo jeans you splurged on, leading to more stress about money wasted! Unfortunately, excess cortisol also slows down your metabolism, because your body wants to maintain an adequate supply of glucose for all that hard mental and physical work dealing with the threat.  Anxiety  When we have a surge of adrenaline as part of our fight/flight response, we get fidgety and activated. Adrenaline is the reason for the “wired up” feeling we get when we’re stressed. While we may burn off some extra calories fidgeting or running around cleaning because we can’t sit still, anxiety can also trigger “emotional eating.” Overeating or eating unhealthy foods in response to stress or as a way to calm down is a very common response. In the most recent American Psychological Association’s “Stress in [place]:” survey, a whopping 40% of respondents reported dealing with stress in this way, while 42% reported watching television for more than 2 hours a day to deal with stress. Being a couch potato also increases the temptation to overeat and is inactive, which means that those extra calories aren’t getting burned off. Anxiety can also make you eat more “mindlessly” as you churn around worrying thoughts in your head, not even focusing on the taste of the food, how much you’ve eaten, or when you are feeling full. When you eat mindlessly, you will likely eat more, yet feel less satisfied.  Cravings and Fast Food  When we are  chronically stressed, we crave “comfort foods,” such as a bag of potato chips or a tub of ice cream. These foods tend to be easy to eat, highly processed, and high in fat, sugar, or salt. We crave these foods for both biological and psychological reasons. Stress may mess up our brain’s reward system or cortisol may cause us to crave more fat and sugar. We also may have memories from childhood, such as the smell of freshly baked cookies,, that lead us to associate sweet foods with comfort. When we are stressed, we also may be more likely to drive through the Fast Food place, rather than taking the time and mental energy to plan and cook a meal. Americans are less likely to cook and eat dinner at home than people from many other countries, and they also work more hours. Working in urban areas may mean long, jammed commutes, which both increase stress and interfere with willpower because we are hungrier when we get home later. A [facility] research study showed, in laboratory mice, that being “stressed” by exposure to the smell of a predator lead the mice to eat more high-fat food pellets, when given the choice of eating these instead of normal feed.  Less Sleep  Do you ever lie awake at night worrying about paying the bills or about who will watch your kids when you have to go to work? According to the APA’s “Stress in [place]” survey, more than 40% of us lie awake at night as a result of stress. Research shows that worry is a major cause of insomnia. Our minds are overactive and won’t switch off. We may also lose sleep because of pulling overnights to cram for exams or writing until the early hours. Stress causes decreased blood sugar, which leads to fatigue. If you drink coffee or caffeinated soft drinks to stay awake, or alcohol to feel better, your sleep cycle will be even more disrupted. Sleep is also a powerful factor influencing weight gain or loss. Lack of sleep may disrupt the functioning of  ghrelin and leptin--chemicals that control appetite. We also crave carbs when we are tired or grumpy from lack of sleep. Finally, not getting our gerda zzzz’s erodes our willpower and ability to resist temptation. In one study, overweight/obese dieters were asked to follow a fixed calorie diet and assigned to get either 5 and a half or eight and a half hours of sleep a night (in a sleep lab). Those with sleep deprivation lost substantially less weight.  How to Minimize Weight Gain When You’re Stressed  Exercise  Aerobic exercise has a one-two punch. It can decrease cortisol and trigger release of chemicals that relieve pain and improve mood. It can also help speed your metabolism so you burn off the extra indulgences  Eat Mindfully  Mindful Eating programs train you in meditation, which helps you cope with stress, and change your consciousness around eating. You learn to slow down and tune in to your sensory experience of the food, including its sight, texture or smell. You also learn to tune into your subjective feelings of hunger or fullness, rather than eating just because it’s a mealtime or because there is food in front of you. A well-designed study of binge-eaters showed that participating in a Mindful Eating program led to fewer binges and reduced depression.  Find Rewarding Activities Unrelated to Food  Taking a hike, reading a book, going to a yoga class, getting a massage, patting your dog, or making time for friends and family can help to relieve stress without adding on the pounds. Although you may feel that you don’t have time for leisure activities with looming deadlines, taking time to relieve stress helps you to feel refreshed, lets you think more clearly, and improves your mood, so you are less likely to overeat.  Write in a Journal  Writing down your experiences and reactions or your most important goals keeps your hands busy and your mind occupied, so you’re less likely to snack on unhealthy  foods. Writing can give you insight into why you’re feeling so stressed and highlight ways of thinking or expectations of yourself that may be increasing the pressure you feel. Writing down your healthy eating and exercise goals may make you more conscious of your desire to live a healthier lifestyle and intensify your commitment. Research studies have also shown that writing expressively or about life goals can improve both mood and health.    Source: Psychology Today  Date: Posted Aug 28, 2013   Author: Paris Silveira, Ph.D    Comfort Foods - Why do they make us happy?    by Cristela Schuster, PhD, HSPP    [facility] @ www.obesityaction.org Summer 2013 Resources    “There, there. Just let me bake you some cookies.” “Don’t cry. We’ll stop and get some ice cream, and it will all be better.”    Do these sound familiar? Maybe statements that you heard as a young child? They were innocent words and very genuine actions on the part of our caregivers to express love and concern to us when we were hurting. The way they knew to do this was through food - and not just any food. Usually, the foods offered were foods rich in fats and carbohydrates - the foods that we have come to term “comfort foods.” In this way, food has come to be used as a special type of medicine, as an anti-depressant of types, to cure the mood that ails us. However, such patterns can become very problematic, especially if it is a habitual pattern causing excessive weight gain.    Scientists continue to research the effect that the chemical composition of foods may have on our moods. While such research is very valuable, I want to focus on the psychology, not the biology, of comfort food.    Food and Customs  All cultures have customs around food. In my childhood, money was scarce and when there was some kind of special occasion, it meant that we would use limited resources to buy special foods. It meant that we were being treated in a special way. Birthdays  meant choosing a special meal and a type of cake and ice cream (within a budget). Funerals involved having food brought to the bereaved. The  ritual is one of special interest to this topic. The message is quite obvious: “I hope this food makes you happier.” Again, nothing is ill-intended and the gift is given with much love and care. However, it is another reinforcement of the use of food to make us “feel better.”    We are given messages early in our lives and then reinforced throughout our lives about how food can make us feel different, to feel better. Because we equate food with happiness, we continue to turn to food for such comfort. And we do feel happy or better, albeit temporarily.    Changing Patterns  1. The key to changing this lifelong pattern of equating food with happiness is to first be aware. Take some time to reflect on how food was used through your life and its connection to emotional states for you.    2. Next, take some time to reflect on your own emotional states. You may keep a feeling journal and write down how you felt each day. In reflecting, you will be more aware of the connection of food to your feelings in the past and more aware of your feelings in the present.    3. Then, the work begins. Take each emotion connected to food and create a list of other things you may do to tend to that emotion. For instance, you may have “sadness” as one emotion that has been connected to eating. Alternative ways to get comfort when sad may be:    Talk to a friend  Cry  Journal your feelings  Listen to music  Write a song or a poem    4. By creating alternatives, you begin to see how you can break the cycle of comfort eating.    5. Post this list of alternatives in a place that you are likely to see it regularly. Consult it. Add to it as needed. Or tonio things off that you have tried that maybe didn’t work.    Food and Behavioral Conditioning  One important means of understanding the connection  between food and behavior is understanding how we are conditioned to have a certain response when we are exposed repeatedly to a stimuli. In this case, when we have been told repeatedly that we can feel better with food (the stimuli), we believe that we do indeed feel better (the response) when we eat cakes and cookies and such. However, what we don’t think about is the other stimulus, the care, the concern, the love that came with the food and how that made us feel. In other words, we may have attributed our response (feeling better) to the wrong stimulus (food) rather than the one that actually did make us feel better, which was the love we felt.    So maybe it is not the food that makes us feel happy. Maybe it is the memory of these people expressing their love and care to us. Maybe that is what really makes us happy. Patients often tell me that they eat when sad, lonely or bored. They are seeking comfort.    They want to feel “full” or “satisfied” and the food does offer that physical release. However, the true comfort that they seek cannot be found in carbohydrates or fat, but it can be found in the feeling of belonging, of connecting with others, of being creative and inspired.    Conclusion  Remember, you have had a lifetime of creating a pattern of using comfort foods, so it is not likely to change quickly. Make sure to give yourself some time to make these changes. When you are able to change the relationship with food, you are able to change your relationships with others, and you just might find more satisfying and healthier relationships.      Return in about 3 months (around 8/14/2024) for weight management via clinic.    DOCUMENTATION OF TIME SPENT: Code selection for this visit was based on time spent : 20 minutes on date of service in preparing to see the patient, obtaining and/or reviewing separately obtained history, performing a medically appropriate examination, counseling and educating the  patient/family/caregiver, ordering medications or testing, referring and communicating with other healthcare providers, documenting clinical information in the electronic medical record, independently interpreting results and communicating results to the patient/family/caregiver and care coordination with the patient's other providers.    Answers submitted by the patient for this visit:  Medical Weight Loss Follow Up (Submitted on 5/13/2024)  If greater than 5/1O how would you grade your coping mechanisms?: fair

## 2024-05-14 NOTE — PATIENT INSTRUCTIONS
Continue making lifestyle changes that focus on good nutrition, regular exercise and stress management.    Medication Plan: Continue Naltrexone. Add Topamax as directed with option to increase after 1 month if needed - can send aaTag message.    Next steps to work on before next office visit include: Reviewed some stress relief measures such as gratitude journal daily, meditation and return to exercise. Tracking nutrition even 2x/week will provide some benefit. Do not hold on to the mentality of all or nothing. Life will never be perfect, strive for progress over perfection.    Why We Gain Weight When We’re Stressed--And How Not To  The psychology and biology of stress-related overeating and weight gain   Emotional Eating under Stress  Have you ever found yourself mindlessly eating a tub of ice cream while you brood about your latest romantic rejection or eating a hamburger and fries in front of your computer as you furiously try to make a work deadline? Perhaps you’re a busy mom, eating cookies in your car as you shuttle the kids back and forth to a slew of activities. Or you’re a small business owner desperately trying to make ends meet when you suddenly realize your waistline has expanded. If you recognize yourself in any of these scenarios, you’re not alone and it’s probably not your fault. Stress that goes on for a long period is a triple whammy for weight--it increases our appetites, makes us hold onto the fat, and interferes with our willpower to implement a healthy lifestyle.  Below are the four major reasons stress leads to weight gain and four great research-based coping strategies you can use to fight back.  Hormones  When your brain detects the presence of a threat, no matter if it is a snake in the grass, a grumpy boss, or a big credit card bill, it triggers the release of a cascade of chemicals, including adrenaline, CRH, and cortisol. Your brain and body prepare to handle the threat by making you  feel alert, ready for action and able to withstand an injury. In the short-term, adrenaline helps you feel less hungry as your blood flows away from the internal organs and to your large muscles to prepare for “fight or flight.” However, once the effects of adrenaline wear off, cortisol, known as the “stress hormone,” hangs around and starts signaling the body to replenish your food supply. Fighting off wild animals, like our ancestors did, used up a lot of energy, so their bodies needed more stores of fat and glucose. Today’s human, who sits on the couch worrying about how to pay the bill or works long hours at the computer to make the deadline, does not work off much energy at all dealing with the stressor! Unfortunately, we are stuck with a neuroendocrine system that didn’t get the update, so your brain is still going to tell you to reach for that plate of cookies anyway.  Belly Fat  In the days when our ancestors were fighting off tigers and famine, their bodies adapted by learning to store fat supplies for the long haul. The unfortunate result for you and me is that when we are chronically stressed by life crises and work-life demands, we are prone to getting an extra layer of “visceral fat” deep in our bellies. Your belly has an ample supply of blood vessels and cortisol receptors to make the whole process flow more efficiently. The downside is that excess belly fat is unhealthy and difficult to get rid of. The fat releases chemicals triggering inflammation, which increases the likelihood that we will develop heart disease or diabetes. And it can make it more difficult to fit into those massimo jeans you splurged on, leading to more stress about money wasted! Unfortunately, excess cortisol also slows down your metabolism, because your body wants to maintain an adequate supply of glucose for all that hard mental and physical work dealing with the threat.  Anxiety  When we have a surge of adrenaline as part of  our fight/flight response, we get fidgety and activated. Adrenaline is the reason for the “wired up” feeling we get when we’re stressed. While we may burn off some extra calories fidgeting or running around cleaning because we can’t sit still, anxiety can also trigger “emotional eating.” Overeating or eating unhealthy foods in response to stress or as a way to calm down is a very common response. In the most recent American Psychological Association’s “Stress in [place]:” survey, a whopping 40% of respondents reported dealing with stress in this way, while 42% reported watching television for more than 2 hours a day to deal with stress. Being a couch potato also increases the temptation to overeat and is inactive, which means that those extra calories aren’t getting burned off. Anxiety can also make you eat more “mindlessly” as you churn around worrying thoughts in your head, not even focusing on the taste of the food, how much you’ve eaten, or when you are feeling full. When you eat mindlessly, you will likely eat more, yet feel less satisfied.  Cravings and Fast Food  When we are chronically stressed, we crave “comfort foods,” such as a bag of potato chips or a tub of ice cream. These foods tend to be easy to eat, highly processed, and high in fat, sugar, or salt. We crave these foods for both biological and psychological reasons. Stress may mess up our brain’s reward system or cortisol may cause us to crave more fat and sugar. We also may have memories from childhood, such as the smell of freshly baked cookies,, that lead us to associate sweet foods with comfort. When we are stressed, we also may be more likely to drive through the Fast Food place, rather than taking the time and mental energy to plan and cook a meal. Americans are less likely to cook and eat dinner at home than people from many other countries, and they also work more hours. Working in urban areas may mean long, jammed commutes, which both  increase stress and interfere with willpower because we are hungrier when we get home later. A [facility] research study showed, in laboratory mice, that being “stressed” by exposure to the smell of a predator lead the mice to eat more high-fat food pellets, when given the choice of eating these instead of normal feed.  Less Sleep  Do you ever lie awake at night worrying about paying the bills or about who will watch your kids when you have to go to work? According to the APA’s “Stress in [place]” survey, more than 40% of us lie awake at night as a result of stress. Research shows that worry is a major cause of insomnia. Our minds are overactive and won’t switch off. We may also lose sleep because of pulling overnights to cram for exams or writing until the early hours. Stress causes decreased blood sugar, which leads to fatigue. If you drink coffee or caffeinated soft drinks to stay awake, or alcohol to feel better, your sleep cycle will be even more disrupted. Sleep is also a powerful factor influencing weight gain or loss. Lack of sleep may disrupt the functioning of ghrelin and leptin--chemicals that control appetite. We also crave carbs when we are tired or grumpy from lack of sleep. Finally, not getting our gerda zzzz’s erodes our willpower and ability to resist temptation. In one study, overweight/obese dieters were asked to follow a fixed calorie diet and assigned to get either 5 and a half or eight and a half hours of sleep a night (in a sleep lab). Those with sleep deprivation lost substantially less weight.  How to Minimize Weight Gain When You’re Stressed  Exercise  Aerobic exercise has a one-two punch. It can decrease cortisol and trigger release of chemicals that relieve pain and improve mood. It can also help speed your metabolism so you burn off the extra indulgences  Eat Mindfully  Mindful Eating programs train you in meditation, which helps you cope with stress, and change your  consciousness around eating. You learn to slow down and tune in to your sensory experience of the food, including its sight, texture or smell. You also learn to tune into your subjective feelings of hunger or fullness, rather than eating just because it’s a mealtime or because there is food in front of you. A well-designed study of binge-eaters showed that participating in a Mindful Eating program led to fewer binges and reduced depression.  Find Rewarding Activities Unrelated to Food  Taking a hike, reading a book, going to a yoga class, getting a massage, patting your dog, or making time for friends and family can help to relieve stress without adding on the pounds. Although you may feel that you don’t have time for leisure activities with looming deadlines, taking time to relieve stress helps you to feel refreshed, lets you think more clearly, and improves your mood, so you are less likely to overeat.  Write in a Journal  Writing down your experiences and reactions or your most important goals keeps your hands busy and your mind occupied, so you’re less likely to snack on unhealthy foods. Writing can give you insight into why you’re feeling so stressed and highlight ways of thinking or expectations of yourself that may be increasing the pressure you feel. Writing down your healthy eating and exercise goals may make you more conscious of your desire to live a healthier lifestyle and intensify your commitment. Research studies have also shown that writing expressively or about life goals can improve both mood and health.    Source: Psychology Today  Date: Posted Aug 28, 2013   Author: Paris Silveira, Ph.D    Comfort Foods - Why do they make us happy?    by Cristela Schuster, PhD, [facility]    [facility] @ www.obesityaction.org Summer 2013 Resources    “There, there. Just let me bake you some cookies.” “Don’t cry. We’ll stop and get some ice cream, and it will all be better.”    Do these sound familiar? Maybe statements that  you heard as a young child? They were innocent words and very genuine actions on the part of our caregivers to express love and concern to us when we were hurting. The way they knew to do this was through food - and not just any food. Usually, the foods offered were foods rich in fats and carbohydrates - the foods that we have come to term “comfort foods.” In this way, food has come to be used as a special type of medicine, as an anti-depressant of types, to cure the mood that ails us. However, such patterns can become very problematic, especially if it is a habitual pattern causing excessive weight gain.    Scientists continue to research the effect that the chemical composition of foods may have on our moods. While such research is very valuable, I want to focus on the psychology, not the biology, of comfort food.    Food and Customs  All cultures have customs around food. In my childhood, money was scarce and when there was some kind of special occasion, it meant that we would use limited resources to buy special foods. It meant that we were being treated in a special way. Birthdays meant choosing a special meal and a type of cake and ice cream (within a budget). Funerals involved having food brought to the bereaved. The  ritual is one of special interest to this topic. The message is quite obvious: “I hope this food makes you happier.” Again, nothing is ill-intended and the gift is given with much love and care. However, it is another reinforcement of the use of food to make us “feel better.”    We are given messages early in our lives and then reinforced throughout our lives about how food can make us feel different, to feel better. Because we equate food with happiness, we continue to turn to food for such comfort. And we do feel happy or better, albeit temporarily.    Changing Patterns  1. The key to changing this lifelong pattern of equating food with happiness is to first be aware. Take some time to  reflect on how food was used through your life and its connection to emotional states for you.    2. Next, take some time to reflect on your own emotional states. You may keep a feeling journal and write down how you felt each day. In reflecting, you will be more aware of the connection of food to your feelings in the past and more aware of your feelings in the present.    3. Then, the work begins. Take each emotion connected to food and create a list of other things you may do to tend to that emotion. For instance, you may have “sadness” as one emotion that has been connected to eating. Alternative ways to get comfort when sad may be:    Talk to a friend  Cry  Journal your feelings  Listen to music  Write a song or a poem    4. By creating alternatives, you begin to see how you can break the cycle of comfort eating.    5. Post this list of alternatives in a place that you are likely to see it regularly. Consult it. Add to it as needed. Or tonio things off that you have tried that maybe didn’t work.    Food and Behavioral Conditioning  One important means of understanding the connection between food and behavior is understanding how we are conditioned to have a certain response when we are exposed repeatedly to a stimuli. In this case, when we have been told repeatedly that we can feel better with food (the stimuli), we believe that we do indeed feel better (the response) when we eat cakes and cookies and such. However, what we don’t think about is the other stimulus, the care, the concern, the love that came with the food and how that made us feel. In other words, we may have attributed our response (feeling better) to the wrong stimulus (food) rather than the one that actually did make us feel better, which was the love we felt.    So maybe it is not the food that makes us feel happy. Maybe it is the memory of these people expressing their love and care to us. Maybe that is what really makes us happy. Patients  often tell me that they eat when sad, lonely or bored. They are seeking comfort.    They want to feel “full” or “satisfied” and the food does offer that physical release. However, the true comfort that they seek cannot be found in carbohydrates or fat, but it can be found in the feeling of belonging, of connecting with others, of being creative and inspired.    Conclusion  Remember, you have had a lifetime of creating a pattern of using comfort foods, so it is not likely to change quickly. Make sure to give yourself some time to make these changes. When you are able to change the relationship with food, you are able to change your relationships with others, and you just might find more satisfying and healthier relationships.

## 2024-05-15 ENCOUNTER — OFFICE VISIT (OUTPATIENT)
Dept: FAMILY MEDICINE CLINIC | Facility: CLINIC | Age: 34
End: 2024-05-15

## 2024-05-15 ENCOUNTER — LAB ENCOUNTER (OUTPATIENT)
Dept: LAB | Age: 34
End: 2024-05-15
Attending: FAMILY MEDICINE

## 2024-05-15 VITALS
SYSTOLIC BLOOD PRESSURE: 130 MMHG | HEIGHT: 74.5 IN | RESPIRATION RATE: 19 BRPM | BODY MASS INDEX: 40 KG/M2 | HEART RATE: 90 BPM | WEIGHT: 315 LBS | TEMPERATURE: 98 F | DIASTOLIC BLOOD PRESSURE: 80 MMHG | OXYGEN SATURATION: 98 %

## 2024-05-15 DIAGNOSIS — E66.01 CLASS 3 SEVERE OBESITY DUE TO EXCESS CALORIES WITH SERIOUS COMORBIDITY AND BODY MASS INDEX (BMI) OF 50.0 TO 59.9 IN ADULT (HCC): ICD-10-CM

## 2024-05-15 DIAGNOSIS — I10 ESSENTIAL HYPERTENSION: Primary | ICD-10-CM

## 2024-05-15 DIAGNOSIS — F41.9 ANXIETY: ICD-10-CM

## 2024-05-15 LAB
ALBUMIN SERPL-MCNC: 3.6 G/DL (ref 3.4–5)
ALBUMIN/GLOB SERPL: 1 {RATIO} (ref 1–2)
ALP LIVER SERPL-CCNC: 54 U/L
ALT SERPL-CCNC: 64 U/L
ANION GAP SERPL CALC-SCNC: 5 MMOL/L (ref 0–18)
AST SERPL-CCNC: 29 U/L (ref 15–37)
BILIRUB SERPL-MCNC: 0.8 MG/DL (ref 0.1–2)
BUN BLD-MCNC: 18 MG/DL (ref 9–23)
CALCIUM BLD-MCNC: 9.3 MG/DL (ref 8.5–10.1)
CHLORIDE SERPL-SCNC: 108 MMOL/L (ref 98–112)
CHOLEST SERPL-MCNC: 207 MG/DL (ref ?–200)
CO2 SERPL-SCNC: 28 MMOL/L (ref 21–32)
CREAT BLD-MCNC: 1.34 MG/DL
EGFRCR SERPLBLD CKD-EPI 2021: 71 ML/MIN/1.73M2 (ref 60–?)
EST. AVERAGE GLUCOSE BLD GHB EST-MCNC: 105 MG/DL (ref 68–126)
FASTING PATIENT LIPID ANSWER: NO
FASTING STATUS PATIENT QL REPORTED: NO
GLOBULIN PLAS-MCNC: 3.7 G/DL (ref 2.8–4.4)
GLUCOSE BLD-MCNC: 92 MG/DL (ref 70–99)
HBA1C MFR BLD: 5.3 % (ref ?–5.7)
HDLC SERPL-MCNC: 47 MG/DL (ref 40–59)
LDLC SERPL CALC-MCNC: 127 MG/DL (ref ?–100)
NONHDLC SERPL-MCNC: 160 MG/DL (ref ?–130)
OSMOLALITY SERPL CALC.SUM OF ELEC: 294 MOSM/KG (ref 275–295)
POTASSIUM SERPL-SCNC: 3.6 MMOL/L (ref 3.5–5.1)
PROT SERPL-MCNC: 7.3 G/DL (ref 6.4–8.2)
SODIUM SERPL-SCNC: 141 MMOL/L (ref 136–145)
TRIGL SERPL-MCNC: 186 MG/DL (ref 30–149)
VLDLC SERPL CALC-MCNC: 33 MG/DL (ref 0–30)

## 2024-05-15 PROCEDURE — 80053 COMPREHEN METABOLIC PANEL: CPT

## 2024-05-15 PROCEDURE — 83036 HEMOGLOBIN GLYCOSYLATED A1C: CPT

## 2024-05-15 PROCEDURE — 80061 LIPID PANEL: CPT

## 2024-05-15 PROCEDURE — 36415 COLL VENOUS BLD VENIPUNCTURE: CPT

## 2024-05-15 PROCEDURE — 99214 OFFICE O/P EST MOD 30 MIN: CPT | Performed by: FAMILY MEDICINE

## 2024-05-15 RX ORDER — LISINOPRIL AND HYDROCHLOROTHIAZIDE 20; 12.5 MG/1; MG/1
1 TABLET ORAL DAILY
Qty: 90 TABLET | Refills: 1 | Status: SHIPPED | OUTPATIENT
Start: 2024-05-15

## 2024-05-15 NOTE — PROGRESS NOTES
[facility] Family Medicine Office Note  Chief Complaint:   Chief Complaint   Patient presents with    Medication Follow-Up    Blood Pressure     F/u       HPI:   This is a 33 year old male coming in for    HTN f/u   -Compliant with lisinopril-hydrochlorothiazide.   -No SE     Anxiety  -Has had dissociative symptoms w/ buspar. He did hold yesterdays afternoon dosage and symptoms improved.   -He is compliant with his other medications and denies any SE with them .  -He is following with therapist.   -Anxiety has been triggered by increased work stress/responsibilities.     Obesity  -Follow with wt loss clinic.   -He is on topamax, naltrexone and  bupropion.   -Exercise/diet have been poor recently d/t work stress.     Past Medical History:    Allergic rhinitis    Anxiety    Asthma (HCC)    Depression    Essential hypertension    Hypertension    Obesity    Restless leg syndrome    Sleep apnea     History reviewed. No pertinent surgical history.  Social History:  Social History     Socioeconomic History    Marital status:    Tobacco Use    Smoking status: Never    Smokeless tobacco: Never   Vaping Use    Vaping status: Never Used   Substance and Sexual Activity    Alcohol use: Yes     Comment: very occasionally    Drug use: Never   Other Topics Concern    Caffeine Concern No    Exercise Yes    Seat Belt Yes    Special Diet No    Stress Concern Yes    Weight Concern Yes     Family History:  Family History   Problem Relation Age of Onset    Hypertension Father     Obesity Father     Depression Mother     Hypertension Brother     Heart Disorder Maternal Grandfather     Asthma Maternal Grandmother     Cancer Maternal Grandmother     Cancer Paternal Grandfather      Allergies:  Allergies   Allergen Reactions    Amoxicillin-Pot Clavulanate DIARRHEA and NAUSEA AND VOMITING     Current Meds:  Current Outpatient Medications   Medication Sig Dispense Refill    lisinopril-hydroCHLOROthiazide 20-12.5 MG Oral Tab  Take 1 tablet by mouth daily. 90 tablet 1    topiramate 25 MG Oral Tab Take 1 tablet (25 mg total) by mouth 2 (two) times daily. Start 1 tab daily for 7 days, then can increase to twice a day as prescribed 60 tablet 2    naltrexone 50 MG Oral Tab Take 1 tablet (50 mg total) by mouth daily with food. For cravings 90 tablet 1    busPIRone 10 MG Oral Tab Take 2 tablets (20 mg total) by mouth 3 (three) times daily. 180 tablet 5    buPROPion HCl ER, SR, 200 MG Oral Tablet 12 Hr Take 1 tablet (200 mg total) by mouth 2 (two) times daily. 90 tablet 1    Omega-3 Fatty Acids (FISH OIL OR) Take 1 capsule by mouth daily.      ARIPiprazole 2 MG Oral Tab Take 1 tablet (2 mg total) by mouth daily.      hydrOXYzine 25 MG Oral Tab Take 1 tablet (25 mg total) by mouth as needed for Anxiety.      Magnesium Citrate 125 MG Oral Cap Take 1 capsule by mouth nightly.      naproxen 500 MG Oral Tab Take 1 tablet (500 mg total) by mouth as needed.      albuterol 108 (90 Base) MCG/ACT Inhalation Aero Soln Inhale 2 puffs into the lungs every 4 (four) hours as needed for Wheezing or Shortness of Breath.      loratadine 10 MG Oral Tab Take 1 tablet (10 mg total) by mouth daily.      Multiple Vitamin (MULTIVITAMIN ADULT) Oral Tab Take 1 tablet by mouth daily.        Counseling given: Not Answered       REVIEW OF SYSTEMS:   Review of Systems   Constitutional: Negative.    HENT: Negative.     Eyes: Negative.    Respiratory: Negative.     Cardiovascular: Negative.    Gastrointestinal: Negative.    Endocrine: Negative.    Genitourinary: Negative.    Musculoskeletal: Negative.    Skin: Negative.    Neurological: Negative.    Psychiatric/Behavioral:          See HPI        EXAM:   /80   Pulse 90   Temp 98 °F (36.7 °C)   Resp 19   Ht 6' 2.5\" (1.892 m)   Wt (!) 404 lb (183.3 kg)   SpO2 98%   BMI 51.18 kg/m²  Estimated body mass index is 51.18 kg/m² as calculated from the following:    Height as of this encounter: 6' 2.5\" (1.892 m).    Weight  as of this encounter: 404 lb (183.3 kg).   Vital signs reviewed.Appears stated age, well groomed.  Physical Exam  Vitals and nursing note reviewed.   Constitutional:       Appearance: Normal appearance. He is obese.   HENT:      Head: Normocephalic and atraumatic.   Cardiovascular:      Rate and Rhythm: Normal rate and regular rhythm.      Pulses: Normal pulses.      Heart sounds: Normal heart sounds. No murmur heard.  Pulmonary:      Effort: Pulmonary effort is normal. No respiratory distress.      Breath sounds: Normal breath sounds. No stridor. No wheezing or rhonchi.   Neurological:      Mental Status: He is alert and oriented to person, place, and time.   Psychiatric:         Mood and Affect: Mood normal.         Behavior: Behavior normal.         Thought Content: Thought content normal.         Judgment: Judgment normal.          ASSESSMENT AND PLAN:   1. Essential hypertension  Excellent control, CPM. Continue low salt diet, regular exercise, work on wt loss. Due for labs. F/u 3mo.   - lisinopril-hydroCHLOROthiazide 20-12.5 MG Oral Tab; Take 1 tablet by mouth daily.  Dispense: 90 tablet; Refill: 1    2. Class 3 severe obesity due to excess calories with serious comorbidity and body mass index (BMI) of 50.0 to 59.9 in adult (HCC)  Continue mgmt as per wt loss clinic. Due for labs.   - CMP Now; Future  - Lipids Now; Future  - Hemoglobin A1C Now; Future    3. Anxiety  Reviewed buspar wean schedule. Would benefit from seeing [facility] med clinic. Reviewed return/call back precautions. F/u 3mo.   - OP REFERRAL TO [facility]      Meds & Refills for this Visit:  Requested Prescriptions     Signed Prescriptions Disp Refills    lisinopril-hydroCHLOROthiazide 20-12.5 MG Oral Tab 90 tablet 1     Sig: Take 1 tablet by mouth daily.       Health Maintenance:  Health Maintenance Due   Topic Date Due    COVID-19 Vaccine (5 - 2023-24 season) 09/01/2023    Annual Depression Screening  01/01/2024       Patient/Caregiver Education:  Patient/Caregiver Education: There are no barriers to learning. Medical education done.   Outcome: Patient verbalizes understanding. Patient is notified to call with any questions, complications, allergies, or worsening or changing symptoms.  Patient is to call with any side effects or complications from the treatments as a result of today.     Problem List:  Patient Active Problem List   Diagnosis    Recurrent major depressive disorder, in remission (HCC)    Anxiety    Attention deficit hyperactivity disorder (ADHD), combined type    Primary hypertension    DAISHA on CPAP    Morbid obesity (HCC)    Mild intermittent asthma without complication (HCC)    Encounter for therapeutic drug monitoring    Class 3 severe obesity with serious comorbidity and body mass index (BMI) of 50.0 to 59.9 in adult (HCC)    Elevated LFTs

## 2024-05-15 NOTE — PATIENT INSTRUCTIONS
Wean buspar   - Go down to 20mg twice daily for 2 weeks  - Then go down to 10mg twice daily for 1 week  - Then go down to 10mg once daily for 1 week  - Then stop.     Consider following up with [facility] med clinic (referral provided).     2. Continue other medications. Continue working on losing weight. Continue low salt diet, regular exercise.

## 2024-06-06 ENCOUNTER — OFFICE VISIT (OUTPATIENT)
Facility: CLINIC | Age: 34
End: 2024-06-06
Payer: COMMERCIAL

## 2024-06-06 VITALS
RESPIRATION RATE: 16 BRPM | HEART RATE: 100 BPM | SYSTOLIC BLOOD PRESSURE: 140 MMHG | HEIGHT: 74 IN | BODY MASS INDEX: 40.43 KG/M2 | DIASTOLIC BLOOD PRESSURE: 70 MMHG | WEIGHT: 315 LBS | OXYGEN SATURATION: 97 % | TEMPERATURE: 98 F

## 2024-06-06 DIAGNOSIS — G47.19 DAYTIME HYPERSOMNOLENCE: ICD-10-CM

## 2024-06-06 DIAGNOSIS — E66.01 MORBID OBESITY (HCC): ICD-10-CM

## 2024-06-06 DIAGNOSIS — I10 PRIMARY HYPERTENSION: ICD-10-CM

## 2024-06-06 DIAGNOSIS — F41.9 ANXIETY: ICD-10-CM

## 2024-06-06 DIAGNOSIS — G47.33 OSA (OBSTRUCTIVE SLEEP APNEA): Primary | ICD-10-CM

## 2024-06-06 PROCEDURE — 99204 OFFICE O/P NEW MOD 45 MIN: CPT | Performed by: INTERNAL MEDICINE

## 2024-06-06 RX ORDER — TRIAMCINOLONE ACETONIDE 55 UG/1
2 SPRAY, METERED NASAL DAILY
Qty: 1 EACH | Refills: 2 | Status: SHIPPED | OUTPATIENT
Start: 2024-06-06 | End: 2024-07-06

## 2024-06-06 RX ORDER — AZELASTINE HYDROCHLORIDE 137 UG/1
1-2 SPRAY, METERED NASAL 2 TIMES DAILY
Qty: 1 EACH | Refills: 3 | Status: SHIPPED | OUTPATIENT
Start: 2024-06-06 | End: 2024-07-06

## 2024-06-06 NOTE — PATIENT INSTRUCTIONS
Plan:    Schedule CPAP titration sleep study to be interpreted by Dr. Dom Whyte   Azelastine 137 mcg 1 puff twice daily as needed for congested and runny nose   Nasacort 2 puffs each nostril daily  ENT consult   Advised about weight loss   Advised against drowsy driving and to avoid alcoholic beverage and respiratory depressants as these may worsen sleep apnea      Follow up: 3  months       Dom Whyte MD      Obstructive Sleep Apnea  Obstructive sleep apnea is a condition caused by air passages becoming narrowed or blocked during sleep. As a result, breathing stops for short periods. Your body wakes up enough for breathing to start again. But you don't remember it. The cycle of stopped breathing and brief awakenings can repeat dozens of times a night. This prevents the body from getting to the deeper stages of sleep that are needed for good rest.   Signs of sleep apnea include loud snoring, noisy breathing, and gasping sounds during sleep. People with sleep apnea often find they use the bathroom many times during the night. Daytime symptoms include waking up tired after a full night's sleep and waking up with headaches. They can also include feeling very sleepy or falling asleep during the day, and having problems with memory or concentration.   Risk factors for sleep apnea include:  Being overweight  Being assigned male at birth, or being in menopause  Smoking  Using alcohol or sedating medicines  Having enlarged structures in the nose or throat such as enlarged tonsils or adenoids, or extra tissue in the airway  Home care  Lifestyle changes that can help treat snoring and sleep apnea include:   If you're overweight, talk with your healthcare provider about a weight-loss plan for you.  Don't drink alcohol for 3 to 4 hours before bedtime.  Don't take sedating medicines. Ask your healthcare provider about the medicines you take.  If you smoke, talk to your provider about ways to quit. It's important to stay  away from secondhand smoke. Don't use e-cigarettes because of their harmful side effects.  Sleep on your side. This can help prevent gravity from pulling relaxed throat tissues into your breathing passages.  If you have allergies or sinus problems that block your nose, ask your provider for help.  Use positive airway pressure (PAP). Discuss with your provider the benefits of using PAP at home. And talk about the type of PAP that's best for you.  Follow-up care  Follow up with your healthcare provider, or as advised. A diagnosis of sleep apnea is made with a sleep study. Your provider can tell you more about this test.   When to get medical care  See your healthcare provider if you have daytime symptoms of sleep apnea. These include:   Waking up tired after a full night's sleep  Waking up with a headache  Feeling very sleepy or falling asleep during the day  Having problems with memory or concentration  Also talk with your provider if your partner tells you that you snore, gasp for air, or stop breathing while you sleep.   Seeing your provider is important because sleep apnea can make you more likely to have certain health problems. These include high blood pressure, heart attack, stroke, and sexual dysfunction. If you have sleep apnea, talk with your healthcare provider about the best treatments for you.   [employer] last reviewed this educational content on 5/1/2022 © 2000-2023 [employer]. All rights reserved. This information is not intended as a substitute for professional medical care. Always follow your healthcare professional's instructions.        Continuous Positive Air Pressure (CPAP)     A mask over the nose gently directs air into the throat to keep the airway open.     Continuous positive air pressure (CPAP) uses gentle air pressure to hold the airway open. CPAP is often the most effective treatment for sleep apnea. It works very well as a treatment for adults diagnosed with obstructive  sleep apnea with a lot of sleepiness. But keep in mind that it can take several adjustments before the setup is right for you.   How CPAP works  The CPAP machine  is a small portable pump that sits beside the bed. The pump sends air through a hose, which is held over your nose alone, or nose and mouth by a mask. Mild air pressure is gently pushed through your airway. The air pressure nudges sagging tissues aside. This widens the airway so you can breathe better. CPAP may be combined with other kinds of therapy for sleep apnea.   Types of air pressure treatments  There are different types of CPAP. Your doctor or CPAP technician will help you decide which type is best for you:   Basic CPAP keeps the pressure constant all night long.  A bilevel device (BiPAP) provides more pressure when you breathe in and less when you breathe out. A BiPAP machine also may be set to provide automatic breaths to maintain breathing if you stop breathing while sleeping.  An autoCPAP device automatically adjusts pressure throughout the night and in response to changes such as body position, sleep stage, and snoring.  [employer] last reviewed this educational content on 7/1/2019  © 7927-5519 [employer]. All rights reserved. This information is not intended as a substitute for professional medical care. Always follow your healthcare professional's instructions.

## 2024-06-06 NOTE — PROGRESS NOTES
Pulmonary/Critical Care/Sleep Medicine    Consult Note     PCP: Arben Mckinney MD   Phone: [phone]   Fax: [phone]     Ref Provider: No ref. provider found     No chief complaint on file.      HPI  I had the pleasure of seeing Lakhwinder Ma who is a pleasant 33 year old male who presents for evaluation of DAISHA       The patient states that he has snored at home in past and was noted to have witnessed apnea, he was diagnosed with DAISHA and has been on CPAP at 13 cwp, he recently moved from [facility],  so he presents for sleep evaluation.      He states bed time around 1030 PM . It takes 5 min  to fall asleep and leaves bed around 630 AM. He wakes up sometimes rarely times a night while using CPAP.  He is sleepy and fatigued during the daytime.    He denies nightmares, sleep talking or sleep walking.  He  denies AM headaches.  He denies symptoms sleep attacks     The patient denies kicking legs at night. Denies  teeth grinding.    The patient reports using CPAP daily at night at 13 cmH2O regularly throughout the night for about 7 hours and states that the  PAP machine is quiet and has a heated humidifier.     He drinks 1    caffeine cans of soda daily.       He has no pets        Hx of tobacco use: He  reports that he has never smoked. He has never used smokeless tobacco.    Past Medical History:    Allergic rhinitis    Anxiety    Asthma (HCC)    Depression    Essential hypertension    Hypertension    Obesity    Restless leg syndrome    Sleep apnea    on CPAP at 13 cwp      No past surgical history on file.  Allergies   Allergen Reactions    Amoxicillin-Pot Clavulanate DIARRHEA and NAUSEA AND VOMITING     Current Outpatient Medications   Medication Sig Dispense Refill    lisinopril-hydroCHLOROthiazide 20-12.5 MG Oral Tab Take 1 tablet by mouth daily. 90 tablet 1    topiramate 25 MG Oral Tab Take 1 tablet (25 mg total) by mouth 2 (two) times daily. Start 1 tab daily for 7 days, then can increase  to twice a day as prescribed 60 tablet 2    naltrexone 50 MG Oral Tab Take 1 tablet (50 mg total) by mouth daily with food. For cravings 90 tablet 1    busPIRone 10 MG Oral Tab Take 2 tablets (20 mg total) by mouth 3 (three) times daily. 180 tablet 5    buPROPion HCl ER, SR, 200 MG Oral Tablet 12 Hr Take 1 tablet (200 mg total) by mouth 2 (two) times daily. 90 tablet 1    Omega-3 Fatty Acids (FISH OIL OR) Take 1 capsule by mouth daily.      ARIPiprazole 2 MG Oral Tab Take 1 tablet (2 mg total) by mouth daily.      hydrOXYzine 25 MG Oral Tab Take 1 tablet (25 mg total) by mouth as needed for Anxiety.      Magnesium Citrate 125 MG Oral Cap Take 1 capsule by mouth nightly.      naproxen 500 MG Oral Tab Take 1 tablet (500 mg total) by mouth as needed.      albuterol 108 (90 Base) MCG/ACT Inhalation Aero Soln Inhale 2 puffs into the lungs every 4 (four) hours as needed for Wheezing or Shortness of Breath.      loratadine 10 MG Oral Tab Take 1 tablet (10 mg total) by mouth daily.      Multiple Vitamin (MULTIVITAMIN ADULT) Oral Tab Take 1 tablet by mouth daily.        Social History     Socioeconomic History    Marital status:    Tobacco Use    Smoking status: Never    Smokeless tobacco: Never   Vaping Use    Vaping status: Never Used   Substance and Sexual Activity    Alcohol use: Yes     Comment: very occasionally    Drug use: Never   Other Topics Concern    Caffeine Concern No    Exercise Yes    Seat Belt Yes    Special Diet No    Stress Concern Yes    Weight Concern Yes      Immunization History   Administered Date(s) Administered    >=3 YRS TRI  MULTIDOSE VIAL (27381) FLU CLINIC 10/08/2016, 10/01/2017, 10/31/2017, 10/17/2018, 10/24/2019    Covid-19 Vaccine Moderna 100 mcg/0.5 ml 01/06/2021, 02/02/2021    Covid-19 Vaccine Moderna 50 Mcg/0.25 Ml 11/22/2021    Covid-19 Vaccine Pfizer Bivalent 30mcg/0.3mL 12/07/2022    DTAP INFANRIX 01/25/1989, 04/05/1989, 06/14/1989, 05/16/1990, 06/01/1994    FLUZONE 6 months and  older PFS 0.5 ml (84335) 10/21/2019, 09/10/2020, 10/13/2021, 10/17/2022, 09/18/2023    HEP B, Adult 12/09/1998, 03/10/1999, 08/25/1999    HIB 05/30/1990    MMR 03/14/1990, 06/01/1994    OPV 01/25/1989, 04/05/1989, 05/16/1990, 06/01/1994    Pneumococcal Conjugate PCV20 11/15/2023    TDAP 08/21/2023    Tb Intradermal Test 07/10/1992, 10/17/2018, 10/24/2019    Td, Preserv Free 07/23/2003      Family History   Problem Relation Age of Onset    Depression Mother     Hypertension Father     Obesity Father     Hypertension Brother     Asthma Maternal Grandmother     Cancer Maternal Grandmother         Bone of foot    Heart Disorder Maternal Grandfather     Cancer Paternal Grandfather         Review of Systems   Constitutional:  Positive for fatigue. Negative for fever and unexpected weight change.   HENT:  Negative for congestion, mouth sores, nosebleeds, postnasal drip, rhinorrhea, sore throat and trouble swallowing.    Eyes:  Negative for visual disturbance.   Respiratory:  Negative for apnea, cough, choking, chest tightness, shortness of breath and wheezing.    Cardiovascular:  Negative for chest pain, palpitations and leg swelling.   Gastrointestinal:  Negative for abdominal pain, constipation, diarrhea, nausea and vomiting.   Genitourinary:  Negative for difficulty urinating.   Musculoskeletal:  Positive for back pain. Negative for arthralgias, gait problem and myalgias.   Neurological:  Negative for dizziness, weakness and headaches.   Psychiatric/Behavioral:  Negative for sleep disturbance.         Vitals:    06/06/24 0931   BP: 140/70   Pulse: 100   Resp: 16   Temp: 98 °F (36.7 °C)      SpO2: 97 %  Ht Readings from Last 1 Encounters:   06/06/24 6' 2\" (1.88 m)     Wt Readings from Last 1 Encounters:   06/06/24 (!) 405 lb (183.7 kg)     Body mass index is 52 kg/m².     Physical Exam  Constitutional:       General: He is not in acute distress.     Appearance: Normal appearance. He is obese. He is not ill-appearing or  diaphoretic.   HENT:      Head: Normocephalic and atraumatic.      Nose: Nose normal. No congestion or rhinorrhea.      Comments: Right nostril nearly completely blocked and edematous. Left nostril narrow      Mouth/Throat:      Mouth: Mucous membranes are moist.      Pharynx: Oropharynx is clear. No oropharyngeal exudate or posterior oropharyngeal erythema.      Comments: Mallampati class IV palate   Eyes:      Extraocular Movements: Extraocular movements intact.      Pupils: Pupils are equal, round, and reactive to light.   Cardiovascular:      Rate and Rhythm: Normal rate.      Pulses: Normal pulses.      Heart sounds: Normal heart sounds. No murmur heard.  Pulmonary:      Effort: Pulmonary effort is normal. No respiratory distress.      Breath sounds: Normal breath sounds. No wheezing or rhonchi.   Chest:      Chest wall: No tenderness.   Abdominal:      General: Abdomen is flat. Bowel sounds are normal.      Palpations: Abdomen is soft.   Musculoskeletal:         General: Normal range of motion.   Skin:     General: Skin is warm.   Neurological:      General: No focal deficit present.      Mental Status: He is alert and oriented to person, place, and time.   Psychiatric:         Mood and Affect: Mood normal.         Behavior: Behavior normal.         Thought Content: Thought content normal.         Judgment: Judgment normal.          Labs:  Last BMP  Lab Results   Component Value Date    GLU 92 05/15/2024    BUN 18 05/15/2024    CREATSERUM 1.34 (H) 05/15/2024    ANIONGAP 5 05/15/2024    CA 9.3 05/15/2024     05/15/2024    K 3.6 05/15/2024     05/15/2024    CO2 28.0 05/15/2024    OSMOCALC 294 05/15/2024      Last CBC  Lab Results   Component Value Date    WBC 5.3 09/14/2023    RBC 5.09 09/14/2023    HGB 15.3 09/14/2023    HCT 46.3 09/14/2023    MCV 91.0 09/14/2023    MCH 30.1 09/14/2023    MCHC 33.0 09/14/2023    RDW 13.3 09/14/2023    .0 09/14/2023      Last CMP  Lab Results   Component Value  Date    GLU 92 05/15/2024    BUN 18 05/15/2024    CREATSERUM 1.34 (H) 05/15/2024    ANIONGAP 5 05/15/2024    CA 9.3 05/15/2024    OSMOCALC 294 05/15/2024    ALKPHO 54 05/15/2024    AST 29 05/15/2024    ALT 64 (H) 05/15/2024    BILT 0.8 05/15/2024    TP 7.3 05/15/2024    ALB 3.6 05/15/2024    GLOBULIN 3.7 05/15/2024     05/15/2024    K 3.6 05/15/2024     05/15/2024    CO2 28.0 05/15/2024      Last Thyroid Function  Lab Results   Component Value Date    TSH 2.080 09/14/2023        Imaging:  No results found.     Polysomnogram 6/30/2020 at [facility] in [place]:  AHI 15 events per hour         Tavares Sleepiness  Scale: (ESS) score on today's visit is 8  out of 24.     Score total of 1-6    Normal sleep   Score total of 7-8    Average sleepiness   Score total of 9-24    Abnormal (possibly pathologic) sleepiness       Impression:    Obstructive sleep apnea syndrome (OSAS):  Attended sleep study at [facility] performed on 6/30/2024 showed 15 events per hour . Treated with CPAP at 13 cwp with a full face mask, download data not available BUT the CPAP pressures have not been adjusted since 2011    Daytime hypersomnolence/fatigue, persistent   Obesity: Class III  ;  Body mass index is 52 kg/m².  Mildly  elevated Creatinine   Allergic Rhinitis   Obstructed right nares   Anxiety   Hypertension   Asthma, Intermittent during childhood   Depression   RLS on Zoloft : resolved after medicine was stopped                             Plan:    Schedule CPAP titration sleep study to be interpreted by Dr. Dom Whyte   Azelastine 137 mcg 1 puff twice daily as needed for congested and runny nose   Nasacort 2 puffs each nostril daily  ENT consult   Advised about weight loss   Advised against drowsy driving and to avoid alcoholic beverage and respiratory depressants as these may worsen sleep apnea      Follow up: 3  months     Thank you for allowing me to participate in your patient care.    REAL Whyte  MD, FACP, FCCP, [facility] - Pulmonary/Critical care/Sleep Medicine  Please contact our office if you have any questions or concerns at [phone]    Note to the patient: The 21st Century Cures Act makes medical notes like these available to patients in the interest of transparency. However, be advised that this is a medical document. It is intended as peer to peer communication. It is written in medical language and may contain abbreviations or verbiage that are unfamiliar. It may appear blunt or direct. Medical documents are intended to carry relevant information, facts as evident, and clinical opinion of the practitioner.      Disclaimer: Components of this note were documented using voice recognition system and are subject to errors not corrected at proofreading. Contact the author of this note for any clarifications

## 2024-06-24 PROBLEM — F41.1 GENERALIZED ANXIETY DISORDER: Status: ACTIVE | Noted: 2024-06-24

## 2024-06-24 PROBLEM — F41.0 PANIC ATTACKS: Status: ACTIVE | Noted: 2024-06-24

## 2024-07-10 DIAGNOSIS — I10 ESSENTIAL HYPERTENSION: ICD-10-CM

## 2024-07-10 RX ORDER — LISINOPRIL AND HYDROCHLOROTHIAZIDE 20; 12.5 MG/1; MG/1
1 TABLET ORAL DAILY
Qty: 90 TABLET | Refills: 1 | OUTPATIENT
Start: 2024-07-10

## 2024-07-10 NOTE — TELEPHONE ENCOUNTER
LOV- 5/15/2024     RF- 5/15/2024 for 6 months      Disp Refills Start End    lisinopril-hydroCHLOROthiazide 20-12.5 MG Oral Tab 90 tablet 1 5/15/2024 --    Sig - Route: Take 1 tablet by mouth daily. - Oral    Sent to pharmacy as: Lisinopril-hydroCHLOROthiazide 20-12.5 MG Oral Tablet (Zestoretic)    E-Prescribing Status: Receipt confirmed by pharmacy (5/15/2024  8:38 AM CDT)

## 2024-07-17 DIAGNOSIS — E66.01 CLASS 3 SEVERE OBESITY WITH SERIOUS COMORBIDITY AND BODY MASS INDEX (BMI) OF 50.0 TO 59.9 IN ADULT, UNSPECIFIED OBESITY TYPE (HCC): ICD-10-CM

## 2024-07-17 DIAGNOSIS — R63.8 CRAVING FOR PARTICULAR FOOD: ICD-10-CM

## 2024-07-17 DIAGNOSIS — Z51.81 ENCOUNTER FOR THERAPEUTIC DRUG MONITORING: ICD-10-CM

## 2024-07-18 ENCOUNTER — PATIENT MESSAGE (OUTPATIENT)
Dept: INTERNAL MEDICINE CLINIC | Facility: CLINIC | Age: 34
End: 2024-07-18

## 2024-07-18 RX ORDER — NALTREXONE HYDROCHLORIDE 50 MG/1
50 TABLET, FILM COATED ORAL
Qty: 90 TABLET | Refills: 1 | OUTPATIENT
Start: 2024-07-18

## 2024-07-18 NOTE — TELEPHONE ENCOUNTER
Requesting naltrexone   LOV: 2/19/24  RTC: 3 months  Filled: 5/14/24 #90 with 1 refills    Future Appointments   Date Time Provider Department Center   7/22/2024 11:20 AM Guera Durbin APRN [id number]   7/29/2024  8:15 PM SCHEDULE BY DATE [facility]   8/15/2024  8:20 AM Arben Mckinney MD [facility]   9/3/2024 10:20 AM Monique Nguyen APRN [facility] [address]

## 2024-07-19 NOTE — TELEPHONE ENCOUNTER
From: Lakhwinder Ma  To: Monique Nguyen  Sent: 7/18/2024 5:11 PM CDT  Subject: Medication refill denied     Hello -  I had requested a refill of my naltrexone and it was denied. Can you let me know the reason? I've been having a lot of issues with [facility] not displaying the correct refill amount or the correct dosage, so I was wondering if they were having issues again.  Thanks  Lakhwinder

## 2024-07-26 ENCOUNTER — TELEPHONE (OUTPATIENT)
Dept: SLEEP CENTER | Age: 34
End: 2024-07-26

## 2024-08-06 ENCOUNTER — MED REC SCAN ONLY (OUTPATIENT)
Facility: CLINIC | Age: 34
End: 2024-08-06

## 2024-09-03 ENCOUNTER — TELEMEDICINE (OUTPATIENT)
Dept: INTERNAL MEDICINE CLINIC | Facility: CLINIC | Age: 34
End: 2024-09-03
Payer: COMMERCIAL

## 2024-09-03 DIAGNOSIS — F43.9 STRESS: ICD-10-CM

## 2024-09-03 DIAGNOSIS — Z51.81 ENCOUNTER FOR THERAPEUTIC DRUG MONITORING: Primary | ICD-10-CM

## 2024-09-03 DIAGNOSIS — R63.8 CRAVING FOR PARTICULAR FOOD: ICD-10-CM

## 2024-09-03 DIAGNOSIS — E66.01 CLASS 3 SEVERE OBESITY WITH SERIOUS COMORBIDITY AND BODY MASS INDEX (BMI) OF 50.0 TO 59.9 IN ADULT, UNSPECIFIED OBESITY TYPE (HCC): ICD-10-CM

## 2024-09-03 PROCEDURE — 99213 OFFICE O/P EST LOW 20 MIN: CPT | Performed by: NURSE PRACTITIONER

## 2024-09-03 RX ORDER — TOPIRAMATE 25 MG/1
25 TABLET, FILM COATED ORAL 2 TIMES DAILY
Qty: 180 TABLET | Refills: 1 | Status: SHIPPED | OUTPATIENT
Start: 2024-09-03

## 2024-09-03 RX ORDER — NALTREXONE HYDROCHLORIDE 50 MG/1
50 TABLET, FILM COATED ORAL
Qty: 90 TABLET | Refills: 1 | Status: SHIPPED | OUTPATIENT
Start: 2024-09-03

## 2024-09-03 NOTE — PATIENT INSTRUCTIONS
Continue making lifestyle changes that focus on good nutrition, regular exercise and stress management.    Medication Plan: Continue Topamax at current dose and restart Naltrexone. Send [employer] message with status of coverage for anti-obesity medications once your new insurance starts. These would be medications: Wegovy and Zepbound.    Next steps to work on before next office visit include: Continue with counseling services to help support mindful eating. Recommend meal delivery program such as [id number] listed below to ease meal prep/planning. Some holiday tips provided as well.    Patient Resources:    Personal Training/Fitness Classes/Health Coaching    [facility] in [place]: Full fitness center with group fitness and personal training located in [place].  Health Coaching with Camille Bellamy, Garfield Stevens, and Fer Arauz at our [facility]- individual coaching to work on your health goals. Call [phone] and/or email @ [email or web address]. Free 60 minute consult when client of [employer] [facility].  [facility] @ [email or web address]. A variety of group fitness options plus various yoga classes [phone] and/or email Bernarda at [email or web address]  [facility] with multiple locations: motionID technologies (www.ParkTAG Social Parking), F45 Training (www.c70yojbsmxa.Tactics Cloud), Fit Body Bootcamp (www.DeckDAQbodybootEmulation and Verification Engineeringp.Tactics Cloud), LÃ¡nzanos (www.Oceans Inc..Tactics Cloud), The Exercise  (www.exercisecoach.com), Club Pilates (www.clubpilates.com)    Online Fitness  Fitness  on Love Records MultiMedia  Fit in 10 DVD series   www.gujzg86LBG.Tactics Cloud  Chair exercises via Sit and Be Fit (www.sitandbefit.org) and Pacinian (www.Murray Technologies.com) or Elio Gaxiola or Yossi Dyer videos on YouTube.  Hip Hop Fit with Shawn Polanco at www.hiphopfit.net    Apps for on the Go Fitness  [place] 7 Minute Workout (orange box with white 7) - free on the go HIIT  training karen  Peloton Karen @ www.onepeloton.com    Nutrition Trackers and Programs  LoseIT! And My Fitness Pal apps and on line for tracking nutrition  NOOM - virtual health coaching  FitFoundation (healthy meals on the go) in [place] @ www.yzvtqicszkdbx1k.PitchBook Data  Zach LINDER @ www.bistromd.PitchBook Data and Paqprh51 (calorie smart and low carb plans recommended) @ www.rzkplw80.com, Metabolic Meals @ www.MyMetabolicMeals.com - individual prepared meals to go  Gobble, Blue Apron, Home , Every Plate, Sunbasket- on line meal delivery programs for preparation at home  [facility] in [place] for homemade meals to go @ www.mealPlyfe.PitchBook Data  Diet Doctor @ www.dietdoctor.com - low carb swaps  Yummly - meal prep and planning karen (www.yummly.com)    Stress, Anxiety, Depression, Trauma  CALM meditation karen (www.calm.com)  Headspace  Don't let anxiety run your life. Using the science of emotion regulation and mindfulness to overcome fear and worry by Goldy Ordaz PsyD and Lamonte Melchor MA.  The [employer] Podcast (September 27, 2023): 6 Magic Words That Stop Anxiety  What Happened to You?- a look at the impact trauma has on behavior written by Radha Nj and Dr. Félix Guajardo  Whole Again by Slick German - discovering your true self after trauma    Mindful Eating/The Hungry Brain  Am I Hungry? Mindful eating virtual  karen (www.amihungry.com)  The Hungry Brain by Keeley Sheikh, PhD  Mindless Eating by Nuno Hernandes  Weight Loss Surgery Will Not Treat Food Addiction by Oxana Lubin Ph.D    Metabolic Dysfunction, Hormones and Cravings  Why We Get Sick? By Buddy Knowles (insulin resistance)  Your Body in Balance: The New Science of Food, Hormones, and Health by Dr. Jacek Hernandez  The Complete Guide to fasting by Dr. Green  Fast Like a Girl by Dr. Sanaz Buchanan  The Menopause Reset by Dr. Sanaz Buchanan  Sugar, Salt & Fat by Marychuy Canseco, Ph.D, R.D.  The Truth About Sugar - documentary on sugar (Free on Utube,  https://youtu.be/9G5yrpiQO6v)  Reverse Visceral Fat: #1 Way to Increase Your Lifespan & End Inflammation with Dr. Hugh Espinoza on Utube @ https://youtu.be/nupPRnvUpJY?si=[email or web address]    Nutrition Support  You Are What You Eat - Netfix series on twin study looking at impact of nutrition changes on health  The End of Dieting: How to Live for Life by Dr. Axel Suarez M.D. or listen to The [employer] Podcast Episode 63: Understanding \"Nutritarian\" Eating w/Dr. Axel Suarez  The Game Changers- Netflix Documentary on plant based nutrition  The Dr. Barbour T5 Wellness Plan by Dr. Timothy Barbour MD  The Complete Guide to fasting by Dr. Green  [email or web address] [facility] (Instagram Dietician with support surrounding nutrition and meal prep/planning)    Education, Motivation and Support Resources  Live to 100: Secrets of the Blue Zones - Netflix series on the secrets to communities living over 100 years old  Atomic Habits by Rojelio Campos (a book about taking small steps to promote greater behavior change)   Motivation migue (black box with white \")- daily supportive messages sent to your phone  Can't Hurt Me by Goldy Cedeno (a book exploring the power of discipline in achieving your goals)  Fed Up - documentary about obesity (Free on Utube)  Www.yourweightmatters.org - Obesity Action Coalition sponsored Blog posts  Obesity Action Coalition Resources on topics specific to weight management (www.obesityaction.org)  Fitlosophy Fitspiration - journal to better health (journal book found at [employer] in fitness aisle)  Tali Gomez talk titled: The Call to Courage (Netflix)  The Exam Room by the Physician's Committee (Podcast)  Nutrition Facts by Dr. Harrison (Podcast)      Holiday Weight and How to Avoid It    Obesity Action Coalition by Babs Yeager, PhD  https://www.obesityaction.org/resources/holiday-weight-and-ota-wp-rcejb-it/      When we think about the holidays many things come to mind - gifts, shopping, parties, family, decorating, long  to-do lists --and delicious holiday treats.    Strategies for Avoiding Holiday Weight Gain  The holiday season is a busy time, and there are eating opportunities everywhere we go, such as family gatherings, office parties with trays of home-baked treats in the lunchroom, holiday and dzd-nv-lqaekpyr programs at our kids’ schools, treat samples being given away as we make our way through the stores to do our holiday shopping and catalogs in our mailboxes with mouth-watering photo spreads on every page. We’re really busy, perhaps too busy to prepare the healthy meals we might otherwise prepare.    Here are a few tips that will help you negotiate this joyful time with minimum risk to your weight management goals:    Focus on maintaining your current weight. Challenging yourself to lose weight over the holidays is setting yourself up for failure.  Don’t gorge on any special holiday food because you only get to eat it once a year. With luck, you’ll still be around to enjoy it next year. On the other hand, don’t deprive yourself of anything you want to taste. Instead, take a mindful bite, savoring the sight, taste, aroma, mouth feel and sound of each special holiday treat. Eating like this leads to increased pleasure, quicker satisfaction and decreased risk for weight gain.  Avoid the trap of thinking you can eat what you want because you can just start over in the New Year. It doesn’t get any easier just because it’s January - there are always other reasons to indulge and to celebrate.  Keep up your exercise routine. This will also help reduce holiday stress.  Keep tabs on yourself. Write down what you eat, weigh yourself if you want to or try on your favorite clothes to make sure they still fit.  Create meaning beyond the food by creating new traditions that have nothing to do with food. For example, change “in our family we always have chocolate cinnamon bread with whipped cream on [place] morning” into “in our family  we always play in the snow (or on the beach), or go for a long walk/take food and gifts to the homeless shelter on [place] morning.”  Sometimes, eating a particular food is our way of remembering a lost loved one. If that applies to you, find another way to remember them, like sharing memories with family members.  Remember all the reasons why reaching and maintaining a healthy weight is important to you.  Remember, unless you’re an elite athlete, you’re unlikely to be able to “exercise off” weeks of overindulgence.  Strategies for Holiday Parties  Most of us love holiday parties and look forward to them all year. We get to dress up and go to nice places, spend time with our nearest and dearest, enjoy our favorite holiday music and engage in the traditions that are meaningful to us. Despite all of the excitement, parties can also be minefields when it comes to honoring our healthy lifestyle goals. When we love parties, we may over-indulge as a way of intensifying the positive emotions we’re already feeling, and when we dislike parties, we may over-indulge in an effort to distract ourselves from the emotional discomfort that we’re feeling.    Here are a few tips that will help you get through holiday parties without sabotaging your goals:    Avoid wearing baggy clothing that allows you to expand as you eat.  After you’ve eaten, stay away from the food tables at the party.  Keep your hands busy by finding a way to help out. It’s the best way to distract yourself from the food.  Avoid alcohol. When we drink, we’re more likely to abandon healthy eating.  Fill up with water and other low-calorie drinks.  Take a healthy dish for the pot luck - something you can eat: consider salad, fruit, raw vegetables and a healthy dip.  Focus on your relationships, not on the food - learn to focus on enjoying the people and the special holiday experiences, on building special memories for yourself and your family.  Meeting new people  is another good way of distracting yourself from the food. If you’re shy, simply be a good listener.  Plan ahead. The best kind of plan, when it comes to food, is about what you are going to eat - not about what you’re not going to eat. If we focus on what we can’t eat (or what we think we shouldn’t eat), this kind of thinking can set us up for failure because it simply leaves us feeling deprived.  Don’t arrive completely famished - you’ll be more likely to eat in a way you’ll later regret. Plan to eat on the light side both before and after the event. Think about your meal plan for the day, and leave yourself some room to eat at the party.  Coping with Holiday Stress  As you know, the holiday season can be joyful and stressful at the same time. There’s so much to do, being around family can sometimes be difficult and often, we set ourselves the goal of creating the “perfect” holiday. Being stressed puts us at risk for stress-based eating in an effort to cope.    Here are some strategies you can use to reduce your stress levels:    Focus on what you’re grateful for.  Practice deep breathing whenever you feel overwhelmed.  Keep up your exercise routine.  Remind yourself to do just one thing at a time.  Remember -- you cannot do more than your best.  Be willing to say “no” to some events, tasks or requests. Sometimes this is the best way we can take care of ourselves.  Create a holiday season schedule for yourself. Schedule and prioritize everything you need to get done.  Reduce your expectations - aim for “good enough,” not “perfect.”  If you’re alone during the holidays, pamper yourself and find a way to help others who are less fortunate. This will help reduce your loneliness.  If your relationships with family members are strained, remember that over-indulging in your favorite holiday comfort foods is not going to change how they behave towards you!  Create fun times for yourself. Having fun is a great way of  reducing stress!  I hope these tips will help you not just get through the holidays, but that they’ll allow you to feel reassured that you can still have a fun and meaningful time without having to sacrifice your weight management goals. Wishing you a happy, healthy and meaningful holiday season!

## 2024-09-03 NOTE — PROGRESS NOTES
[facility] Weight Management follow up via video visit:    Subjective    This visit is conducted using Telemedicine with live, interactive video and audio.    Chief Complaint:  Routine follow up visit for lifestyle and medical management for overweight, obesity, or morbid obesity. LOV in clinic weight: 395# as well as reported at time of VV in 5/2024.    PMH reviewed. Bariatric surgery planned or hx of surgery in the past: 5/10.    HPI:   Lakhwinder Ma is a 33 year old male who is being followed up today for lifestyle and medical management as deemed appropriate for overweight, obesity or morbid obesity. Patient reports no weight loss monitoring since LOV 7 months ago in clinic. Patient has been off naltrexone d/t pharmacy issues. However he feels it was the most effective for helping stress eating. Has been consistent with Topamax.    Questions/Concerns/Comments since LOV: High stress with work. Will be starting a new job next week and hoping less stress as a result. New insurance will start 11/1/24. LOV with dietician, Lesvia, in 10/2023. Little time and energy for meal prepping reported.    Lifestyle/Social Hx Reviewed:    [facility] Medical Weight Loss Follow Up    Question 9/2/2024 11:05 AM CDT - Filed by Patient   Please describe a success moment: Not sure if I've had one. I'll be starting a new job that is hopefully less stressful   Please describe a challenging moment/needs for improvement: I've been struggling with stress eating, and have had difficulty getting my prescriptions filled.   Please complete this 24 hour food journal, listing everything you had to eat in the past day. Include the average time of day you ate these meals at    List foods, qty and prep for breakfast: Canned cinnamon rolls, 4   List foods, qty and prep for lunch. Leftover pizza, 4 slices   List foods, qty and prep for dinner. 2 hot dogs, corn chips   List foods, qty and prep for snacks.    List the types and qty of fluids consumed  Water, several glasses, diet pepsi 1 can   On average, how many meals did you eat out per week? 8   Exercise    How many days per week are you active or exercise 0   On average, how many days were anaerobic (strength/resistance) exercises performed? 0   On average, how many days were aerobic (cardio) exercises performed? 0   Perceived level of exertion on a scale of 1-5, with 5 being very intense: 1   Stress    Average stress level on a scale of 1-10, with 10 being extremely stressed: 8-in regular counseling   If greater than 5/1O how would you grade your coping mechanisms? fair   Sleep hours and integrity    How many hours of uninterrupted sleep do you get a night: 7   Do you feel rested in the morning: Yes   If no, what may have been disrupting your sleep?    Please list any goal(s) for your next visit Want to get back on track, get medicine figured out       ROS  General: feeling well, denies fatigue  CV: denies cp, palpitations  Resp: denies sob  GI: denies abdominal pain. Denies N/V/D/C.  Neuro: denies paresthesia or cognitive changes   Psych: denies any mood changes    Physical Exam:  >   BP Readings from Last 3 Encounters:   06/06/24 140/70   05/15/24 130/80   02/19/24 134/84       Home weight: see above  Home BP: not available  Home blood sugars: n/a    General: patient speaking in full sentences, no increased work of breathing. alert, appears stated age, cooperative, no distress, and morbidly obese  HENT: normocephalic  Resp: Breathing is non labored  Psych: patient appears cheerful, smiling, making good eye contact    Diagnoses and all orders for this visit:    Encounter for therapeutic drug monitoring  -     naltrexone 50 MG Oral Tab; Take 1 tablet (50 mg total) by mouth daily with food. For cravings  -     topiramate 25 MG Oral Tab; Take 1 tablet (25 mg total) by mouth 2 (two) times daily.    Class 3 severe obesity with serious comorbidity and body mass index (BMI) of 50.0 to 59.9 in adult, unspecified  obesity type (HCC)  -     naltrexone 50 MG Oral Tab; Take 1 tablet (50 mg total) by mouth daily with food. For cravings  -     topiramate 25 MG Oral Tab; Take 1 tablet (25 mg total) by mouth 2 (two) times daily.    Craving for particular food  -     naltrexone 50 MG Oral Tab; Take 1 tablet (50 mg total) by mouth daily with food. For cravings  -     topiramate 25 MG Oral Tab; Take 1 tablet (25 mg total) by mouth 2 (two) times daily.    Stress        Patient Instructions   Continue making lifestyle changes that focus on good nutrition, regular exercise and stress management.    Medication Plan: Continue Topamax at current dose and restart Naltrexone. Send [employer] message with status of coverage for anti-obesity medications once your new insurance starts. These would be medications: Wegovy and Zepbound.    Next steps to work on before next office visit include: Continue with counseling services to help support mindful eating. Recommend meal delivery program such as Axkjso92 listed below to ease meal prep/planning. Some holiday tips provided as well.    Patient Resources:    Personal Training/Fitness Classes/Health Coaching    [facility] in [place]: Full fitness center with group fitness and personal training located in [place].  Health Coaching with Camille Bellamy, Garfield Stevens, and Fer Arauz at our [facility]- individual coaching to work on your health goals. Call [phone] and/or email @ [email or web address]. Free 60 minute consult when client of [facility].  [facility] @ http://www.[facility].The Auto Vault. A variety of group fitness options plus various yoga classes [phone] and/or email Bernarda at [email or web address]  [facility] Fitness Centers with multiple locations: BetterDoctor Fitness (www.CytoLogic.The Auto Vault), F45 Training (www.t47djmfxzbl.The Auto Vault), ExpoPromoter Body Bootcamp (www.MindClick GlobalbodyboAeroDronp.The Auto Vault), iStreamPlanet (www.Eventure Interactive.The Auto Vault),  The Exercise  (www.exercisecoach.com), Club Pilates (www.clubpilates.E-Buy)    Online Fitness  Fitness  on Utube  Fit in 10 DVD series   www.cganr31TNX.E-Buy  Chair exercises via Sit and Be Fit (www.sitandbefit.org) and MediaLAB (www.ImmuMetrix.com) or Elio Gaxiola or Yossi Dyer videos on YouTube.  Hip Hop Fit with Shawn Polanco at www.hiphopfit.net    Apps for on the Go Fitness  GreenGoose! 7 Minute Workout (orange box with white 7) - free on the go HIIT training karen  Peloton Karen @ www.onepeloton.com    Nutrition Trackers and Programs  LoseIT! And My Fitness Pal apps and on line for tracking nutrition  NOOM - virtual health coaching  FitFoundation (healthy meals on the go) in [place] @ wwwCellesgsuqvgjchvrsi4hYunzhisheng  Bistro MD @ [employer] GroupbistrEconotherm and Blxgsb25 (calorie smart and low carb plans recommended) @ www.zgvfup92.com, Metabolic Meals @ www.Red Falcon DevelopmentMetabolicMeals.E-Buy - individual prepared meals to go  Gobble, Blue Apron, Home , Every Plate, Sunbasket- on line meal delivery programs for preparation at home  Meal Village in [place] for homemade meals to go @ www.mealvillage.E-Buy  Diet Doctor @ www.dietdoctor.E-Buy - low carb swaps  YuApplaud - meal prep and planning karen (www.yummly.com)    Stress, Anxiety, Depression, Trauma  CALM meditation karen (www.calm.com)  Headspace  Don't let anxiety run your life. Using the science of emotion regulation and mindfulness to overcome fear and worry by Goldy Ordaz PsyD and Lamonte Melchor MA.  The PHYSICIANS IMMEDIATE CARE Podcast (September 27, 2023): 6 Magic Words That Stop Anxiety  What Happened to You?- a look at the impact trauma has on behavior written by Radha Nj and Dr. Félix Guajardo  Whole Again by Slick German - discovering your true self after trauma    Mindful Eating/The Hungry Brain  Am I Hungry? Mindful eating virtual  karen (www.amihungry.com)  The Hungry Brain by Keeley Kori, PhD  Mindless Eating by Nuno Hernandes  Weight Loss Surgery Will Not Treat  Food Addiction by Oxana Lubin Ph.D    Metabolic Dysfunction, Hormones and Cravings  Why We Get Sick? By Buddy Knowles (insulin resistance)  Your Body in Balance: The New Science of Food, Hormones, and Health by Dr. Jacek Hernandez  The Complete Guide to fasting by Dr. Green  Fast Like a Girl by Dr. Sanaz Buchanan  The Menopause Reset by Dr. Sanaz Buchanan  Sugar, Salt & Fat by Marychuy Canseco, Ph.D, R.D.  The Truth About Sugar - documentary on sugar (Free on Utube, https://eRepubliku.be/8B8yruhCG1x)  Reverse Visceral Fat: #1 Way to Increase Your Lifespan & End Inflammation with Dr. Hugh Espinoza on Utube @ https://eRepubliku.be/nupPRnvUpJY?si=vn8dspHyFVF3YjiP    Nutrition Support  You Are What You Eat - Netfix series on twin study looking at impact of nutrition changes on health  The End of Dieting: How to Live for Life by Dr. Axel Suarez M.D. or listen to The Zarpo Podcast Episode 63: Understanding \"Nutritarian\" Eating w/Dr. Axel Suarez  The Game Changers- Networked Insights Documentary on plant based nutrition  The Dr. Barbour  Wellness Plan by Dr. Timothy Barbour MD  The Complete Guide to fasting by Dr. Green  [email or web address] (Instagram Dietician with support surrounding nutrition and meal prep/planning)    Education, Motivation and Support Resources  Live to 100: Secrets of the Blue Zones - Plixiix series on the secrets to communities living over 100 years old  Atomic Habits by Rojelio Campos (a book about taking small steps to promote greater behavior change)   Motivation migue (black box with white \")- daily supportive messages sent to your phone  Can't Hurt Me by Goldy Cedeno (a book exploring the power of discipline in achieving your goals)  Fed Up - documentary about obesity (Free on Utube)  Www.yourweightmatters.org - Obesity Action Coalition sponsored Blog posts  Obesity Action Coalition Resources on topics specific to weight management (www.obesityaction.org)  Fitlosophy Fitspiration - journal to better health (journal book found  at [employer] in fitness aisle)  Tali Gomez talk titled: The Call to Courage (Netflix)  The Exam Room by the Physician's Committee (Podcast)  Nutrition Facts by Dr. Harrison (Podcast)      Holiday Weight and How to Avoid It    Obesity Action Coalition by Babs Yeager, PhD  https://www.obesityaction.org/resources/holiday-weight-and-xiq-kf-uhetx-it/      When we think about the holidays many things come to mind - gifts, shopping, parties, family, decorating, long to-do lists --and delicious holiday treats.    Strategies for Avoiding Holiday Weight Gain  The holiday season is a busy time, and there are eating opportunities everywhere we go, such as family gatherings, office parties with trays of home-baked treats in the lunchroom, holiday and trb-pj-rpujrhqx programs at our kids’ schools, treat samples being given away as we make our way through the stores to do our holiday shopping and catalogs in our mailboxes with mouth-watering photo spreads on every page. We’re really busy, perhaps too busy to prepare the healthy meals we might otherwise prepare.    Here are a few tips that will help you negotiate this joyful time with minimum risk to your weight management goals:    Focus on maintaining your current weight. Challenging yourself to lose weight over the holidays is setting yourself up for failure.  Don’t gorge on any special holiday food because you only get to eat it once a year. With luck, you’ll still be around to enjoy it next year. On the other hand, don’t deprive yourself of anything you want to taste. Instead, take a mindful bite, savoring the sight, taste, aroma, mouth feel and sound of each special holiday treat. Eating like this leads to increased pleasure, quicker satisfaction and decreased risk for weight gain.  Avoid the trap of thinking you can eat what you want because you can just start over in the New Year. It doesn’t get any easier just because it’s January - there are always other reasons to indulge  and to celebrate.  Keep up your exercise routine. This will also help reduce holiday stress.  Keep tabs on yourself. Write down what you eat, weigh yourself if you want to or try on your favorite clothes to make sure they still fit.  Create meaning beyond the food by creating new traditions that have nothing to do with food. For example, change “in our family we always have chocolate cinnamon bread with whipped cream on Carli morning” into “in our family we always play in the snow (or on the beach), or go for a long walk/take food and gifts to the homeless shelter on Carli morning.”  Sometimes, eating a particular food is our way of remembering a lost loved one. If that applies to you, find another way to remember them, like sharing memories with family members.  Remember all the reasons why reaching and maintaining a healthy weight is important to you.  Remember, unless you’re an elite athlete, you’re unlikely to be able to “exercise off” weeks of overindulgence.  Strategies for Holiday Parties  Most of us love holiday parties and look forward to them all year. We get to dress up and go to nice places, spend time with our nearest and dearest, enjoy our favorite holiday music and engage in the traditions that are meaningful to us. Despite all of the excitement, parties can also be minefields when it comes to honoring our healthy lifestyle goals. When we love parties, we may over-indulge as a way of intensifying the positive emotions we’re already feeling, and when we dislike parties, we may over-indulge in an effort to distract ourselves from the emotional discomfort that we’re feeling.    Here are a few tips that will help you get through holiday parties without sabotaging your goals:    Avoid wearing baggy clothing that allows you to expand as you eat.  After you’ve eaten, stay away from the food tables at the party.  Keep your hands busy by finding a way to help out. It’s the best way to distract yourself  from the food.  Avoid alcohol. When we drink, we’re more likely to abandon healthy eating.  Fill up with water and other low-calorie drinks.  Take a healthy dish for the pot luck - something you can eat: consider salad, fruit, raw vegetables and a healthy dip.  Focus on your relationships, not on the food - learn to focus on enjoying the people and the special holiday experiences, on building special memories for yourself and your family.  Meeting new people is another good way of distracting yourself from the food. If you’re shy, simply be a good listener.  Plan ahead. The best kind of plan, when it comes to food, is about what you are going to eat - not about what you’re not going to eat. If we focus on what we can’t eat (or what we think we shouldn’t eat), this kind of thinking can set us up for failure because it simply leaves us feeling deprived.  Don’t arrive completely famished - you’ll be more likely to eat in a way you’ll later regret. Plan to eat on the light side both before and after the event. Think about your meal plan for the day, and leave yourself some room to eat at the party.  Coping with Holiday Stress  As you know, the holiday season can be joyful and stressful at the same time. There’s so much to do, being around family can sometimes be difficult and often, we set ourselves the goal of creating the “perfect” holiday. Being stressed puts us at risk for stress-based eating in an effort to cope.    Here are some strategies you can use to reduce your stress levels:    Focus on what you’re grateful for.  Practice deep breathing whenever you feel overwhelmed.  Keep up your exercise routine.  Remind yourself to do just one thing at a time.  Remember -- you cannot do more than your best.  Be willing to say “no” to some events, tasks or requests. Sometimes this is the best way we can take care of ourselves.  Create a holiday season schedule for yourself. Schedule and prioritize everything you need to  get done.  Reduce your expectations - aim for “good enough,” not “perfect.”  If you’re alone during the holidays, pamper yourself and find a way to help others who are less fortunate. This will help reduce your loneliness.  If your relationships with family members are strained, remember that over-indulging in your favorite holiday comfort foods is not going to change how they behave towards you!  Create fun times for yourself. Having fun is a great way of reducing stress!  I hope these tips will help you not just get through the holidays, but that they’ll allow you to feel reassured that you can still have a fun and meaningful time without having to sacrifice your weight management goals. Wishing you a happy, healthy and meaningful holiday season!        Return in about 4 months (around 1/3/2025) for weight management via clinic.    DOCUMENTATION OF TIME SPENT: Code selection for this visit was based on time spent : 20 minutes on date of service in preparing to see the patient, obtaining and/or reviewing separately obtained history, performing a medically appropriate examination, counseling and educating the patient/family/caregiver, ordering medications or testing, referring and communicating with other healthcare providers, documenting clinical information in the electronic medical record, independently interpreting results and communicating results to the patient/family/caregiver and care coordination with the patient's other providers.    Answers submitted by the patient for this visit:  Medical Weight Loss Follow Up (Submitted on 9/2/2024)  If greater than 5/1O how would you grade your coping mechanisms?: fair

## 2024-09-22 DIAGNOSIS — F33.40 RECURRENT MAJOR DEPRESSIVE DISORDER, IN REMISSION (HCC): ICD-10-CM

## 2024-09-23 RX ORDER — BUPROPION HYDROCHLORIDE 200 MG/1
200 TABLET, EXTENDED RELEASE ORAL 2 TIMES DAILY
Qty: 180 TABLET | Refills: 0 | Status: SHIPPED | OUTPATIENT
Start: 2024-09-23

## 2024-11-06 PROBLEM — F32.1 CURRENT MODERATE EPISODE OF MAJOR DEPRESSIVE DISORDER WITHOUT PRIOR EPISODE (HCC): Status: ACTIVE | Noted: 2019-10-31

## 2024-11-08 ENCOUNTER — OFFICE VISIT (OUTPATIENT)
Dept: FAMILY MEDICINE CLINIC | Facility: CLINIC | Age: 34
End: 2024-11-08
Payer: COMMERCIAL

## 2024-11-08 ENCOUNTER — LAB ENCOUNTER (OUTPATIENT)
Dept: LAB | Age: 34
End: 2024-11-08
Attending: FAMILY MEDICINE
Payer: COMMERCIAL

## 2024-11-08 VITALS
HEIGHT: 74 IN | SYSTOLIC BLOOD PRESSURE: 126 MMHG | DIASTOLIC BLOOD PRESSURE: 86 MMHG | RESPIRATION RATE: 18 BRPM | WEIGHT: 315 LBS | HEART RATE: 102 BPM | BODY MASS INDEX: 40.43 KG/M2 | OXYGEN SATURATION: 95 %

## 2024-11-08 DIAGNOSIS — F33.40 RECURRENT MAJOR DEPRESSIVE DISORDER, IN REMISSION (HCC): ICD-10-CM

## 2024-11-08 DIAGNOSIS — Z00.00 LABORATORY EXAMINATION ORDERED AS PART OF A ROUTINE GENERAL MEDICAL EXAMINATION: ICD-10-CM

## 2024-11-08 DIAGNOSIS — M25.562 ACUTE PAIN OF LEFT KNEE: ICD-10-CM

## 2024-11-08 DIAGNOSIS — I10 PRIMARY HYPERTENSION: Primary | ICD-10-CM

## 2024-11-08 DIAGNOSIS — F41.9 ANXIETY: ICD-10-CM

## 2024-11-08 DIAGNOSIS — E66.01 MORBID OBESITY (HCC): ICD-10-CM

## 2024-11-08 DIAGNOSIS — J45.20 MILD INTERMITTENT ASTHMA WITHOUT COMPLICATION (HCC): ICD-10-CM

## 2024-11-08 LAB
ALBUMIN SERPL-MCNC: 4.1 G/DL (ref 3.2–4.8)
ALBUMIN/GLOB SERPL: 1.2 {RATIO} (ref 1–2)
ALP LIVER SERPL-CCNC: 54 U/L
ALT SERPL-CCNC: 78 U/L
ANION GAP SERPL CALC-SCNC: 4 MMOL/L (ref 0–18)
AST SERPL-CCNC: 39 U/L (ref ?–34)
BASOPHILS # BLD AUTO: 0.07 X10(3) UL (ref 0–0.2)
BASOPHILS NFR BLD AUTO: 1.3 %
BILIRUB SERPL-MCNC: 0.6 MG/DL (ref 0.3–1.2)
BUN BLD-MCNC: 13 MG/DL (ref 9–23)
CALCIUM BLD-MCNC: 9.9 MG/DL (ref 8.7–10.4)
CHLORIDE SERPL-SCNC: 107 MMOL/L (ref 98–112)
CHOLEST SERPL-MCNC: 200 MG/DL (ref ?–200)
CO2 SERPL-SCNC: 28 MMOL/L (ref 21–32)
CREAT BLD-MCNC: 1.15 MG/DL
EGFRCR SERPLBLD CKD-EPI 2021: 85 ML/MIN/1.73M2 (ref 60–?)
EOSINOPHIL # BLD AUTO: 0.31 X10(3) UL (ref 0–0.7)
EOSINOPHIL NFR BLD AUTO: 5.7 %
ERYTHROCYTE [DISTWIDTH] IN BLOOD BY AUTOMATED COUNT: 13.3 %
FASTING PATIENT LIPID ANSWER: YES
FASTING STATUS PATIENT QL REPORTED: YES
GLOBULIN PLAS-MCNC: 3.4 G/DL (ref 2–3.5)
GLUCOSE BLD-MCNC: 91 MG/DL (ref 70–99)
HCT VFR BLD AUTO: 46.5 %
HDLC SERPL-MCNC: 44 MG/DL (ref 40–59)
HGB BLD-MCNC: 15.8 G/DL
IMM GRANULOCYTES # BLD AUTO: 0.01 X10(3) UL (ref 0–1)
IMM GRANULOCYTES NFR BLD: 0.2 %
LDLC SERPL CALC-MCNC: 122 MG/DL (ref ?–100)
LYMPHOCYTES # BLD AUTO: 1.29 X10(3) UL (ref 1–4)
LYMPHOCYTES NFR BLD AUTO: 23.9 %
MCH RBC QN AUTO: 31.2 PG (ref 26–34)
MCHC RBC AUTO-ENTMCNC: 34 G/DL (ref 31–37)
MCV RBC AUTO: 91.9 FL
MONOCYTES # BLD AUTO: 0.54 X10(3) UL (ref 0.1–1)
MONOCYTES NFR BLD AUTO: 10 %
NEUTROPHILS # BLD AUTO: 3.18 X10 (3) UL (ref 1.5–7.7)
NEUTROPHILS # BLD AUTO: 3.18 X10(3) UL (ref 1.5–7.7)
NEUTROPHILS NFR BLD AUTO: 58.9 %
NONHDLC SERPL-MCNC: 156 MG/DL (ref ?–130)
OSMOLALITY SERPL CALC.SUM OF ELEC: 288 MOSM/KG (ref 275–295)
PLATELET # BLD AUTO: 236 10(3)UL (ref 150–450)
POTASSIUM SERPL-SCNC: 3.9 MMOL/L (ref 3.5–5.1)
PROT SERPL-MCNC: 7.5 G/DL (ref 5.7–8.2)
RBC # BLD AUTO: 5.06 X10(6)UL
SODIUM SERPL-SCNC: 139 MMOL/L (ref 136–145)
TRIGL SERPL-MCNC: 192 MG/DL (ref 30–149)
TSI SER-ACNC: 1.91 UIU/ML (ref 0.55–4.78)
VLDLC SERPL CALC-MCNC: 34 MG/DL (ref 0–30)
WBC # BLD AUTO: 5.4 X10(3) UL (ref 4–11)

## 2024-11-08 PROCEDURE — 99214 OFFICE O/P EST MOD 30 MIN: CPT | Performed by: FAMILY MEDICINE

## 2024-11-08 PROCEDURE — 80050 GENERAL HEALTH PANEL: CPT | Performed by: FAMILY MEDICINE

## 2024-11-08 PROCEDURE — 3079F DIAST BP 80-89 MM HG: CPT | Performed by: FAMILY MEDICINE

## 2024-11-08 PROCEDURE — 3074F SYST BP LT 130 MM HG: CPT | Performed by: FAMILY MEDICINE

## 2024-11-08 PROCEDURE — 3008F BODY MASS INDEX DOCD: CPT | Performed by: FAMILY MEDICINE

## 2024-11-08 PROCEDURE — 80061 LIPID PANEL: CPT | Performed by: FAMILY MEDICINE

## 2024-11-08 NOTE — PROGRESS NOTES
[facility] Family Medicine Office Note  Chief Complaint:   Chief Complaint   Patient presents with    Medication Follow-Up     BP med       HPI:   This is a 33 year old male coming in for chronic condition follow up:    HTN - has been compliant with lisinopril-hydrochlorothiazide.     L knee pain - ongoing for the past 1 week. Difficulty with walking up stairs. Reports stiffness. Has been improving. No trauma or injury. Denies any swelling. No numbness/tingling. Has not been treating it any way.     Depression/anxiety - overall has been controlled but more recently since election his symptoms have been heightened. He did start new job which has helped his mental health. He is on Abilify, Wellbutrin, prn clonazepam, prn hydroxyzine. Reports good relief with medications.     Obesity - following with wt loss clinic.  He was off the weight loss medications (naltrexone, wellbutrin) for 2 months because he didn't have insurance but he has restarted. He did gain 20 lbs since last OV.     Past Medical History:    Allergic rhinitis    Anxiety    Asthma (HCC)    COVID-19    Depression    Essential hypertension    Hypertension    Obesity    Restless leg syndrome    Sleep apnea    on CPAP at 11 cwp     History reviewed. No pertinent surgical history.  Social History:  Social History     Socioeconomic History    Marital status:    Occupational History    Occupation: Ob/Gyn computer science [employer]   Tobacco Use    Smoking status: Never    Smokeless tobacco: Never   Vaping Use    Vaping status: Never Used   Substance and Sexual Activity    Alcohol use: Yes     Comment: very occasionally    Drug use: Never   Other Topics Concern    Caffeine Concern No    Exercise Yes    Seat Belt Yes    Special Diet No    Stress Concern Yes    Weight Concern Yes     Family History:  Family History   Problem Relation Age of Onset    Hypertension Father     Obesity Father     Anxiety Mother     Depression Mother     Asthma Maternal  Grandmother     Cancer Maternal Grandmother         Bone of foot    Heart Disorder Maternal Grandfather     Cancer Paternal Grandfather     Hypertension Brother      Allergies:  Allergies[1]  Current Meds:  Current Outpatient Medications   Medication Sig Dispense Refill    buPROPion HCl ER, SR, 200 MG Oral Tablet 12 Hr Take 1 tablet (200 mg total) by mouth 2 (two) times daily. 180 tablet 1    clonazePAM 1 MG Oral Tab Take 1 tablet (1 mg total) by mouth 2 (two) times daily. 180 tablet 1    ARIPiprazole 2 MG Oral Tab Take 1 tablet (2 mg total) by mouth daily. 90 tablet 1    naltrexone 50 MG Oral Tab Take 1 tablet (50 mg total) by mouth daily with food. For cravings 90 tablet 1    topiramate 25 MG Oral Tab Take 1 tablet (25 mg total) by mouth 2 (two) times daily. 180 tablet 1    lisinopril-hydroCHLOROthiazide 20-12.5 MG Oral Tab Take 1 tablet by mouth daily. 90 tablet 1    Omega-3 Fatty Acids (FISH OIL OR) Take 1 capsule by mouth daily.      hydrOXYzine 25 MG Oral Tab Take 1 tablet (25 mg total) by mouth as needed for Anxiety.      naproxen 500 MG Oral Tab Take 1 tablet (500 mg total) by mouth as needed.      albuterol 108 (90 Base) MCG/ACT Inhalation Aero Soln Inhale 2 puffs into the lungs every 4 (four) hours as needed for Wheezing or Shortness of Breath.      loratadine 10 MG Oral Tab Take 1 tablet (10 mg total) by mouth daily.      Multiple Vitamin (MULTIVITAMIN ADULT) Oral Tab Take 1 tablet by mouth daily.        Counseling given: Not Answered       REVIEW OF SYSTEMS:   Review of Systems   Constitutional: Negative.    HENT: Negative.     Eyes: Negative.    Respiratory: Negative.     Cardiovascular: Negative.    Gastrointestinal: Negative.    Endocrine: Negative.    Genitourinary: Negative.    Musculoskeletal:  Positive for arthralgias (L knee stiffness).   Skin: Negative.    Neurological: Negative.    Psychiatric/Behavioral: Negative.          EXAM:   /86   Pulse 102   Resp 18   Ht 6' 2\" (1.88 m)   Wt  (!) 426 lb (193.2 kg)   SpO2 95%   BMI 54.70 kg/m²  Estimated body mass index is 54.7 kg/m² as calculated from the following:    Height as of this encounter: 6' 2\" (1.88 m).    Weight as of this encounter: 426 lb (193.2 kg).   Vital signs reviewed.Appears stated age, well groomed.  Physical Exam  Vitals and nursing note reviewed.   Constitutional:       Appearance: Normal appearance. He is obese.   HENT:      Head: Normocephalic and atraumatic.   Eyes:      Pupils: Pupils are equal, round, and reactive to light.   Cardiovascular:      Rate and Rhythm: Normal rate and regular rhythm.      Pulses: Normal pulses.      Heart sounds: Normal heart sounds. No murmur heard.  Pulmonary:      Effort: Pulmonary effort is normal. No respiratory distress.      Breath sounds: Normal breath sounds. No wheezing or rhonchi.   Abdominal:      General: Abdomen is flat. Bowel sounds are normal. There is no distension.      Palpations: Abdomen is soft. There is no mass.      Tenderness: There is no abdominal tenderness.      Hernia: No hernia is present.   Musculoskeletal:      Left knee: No swelling or deformity. Normal range of motion. Tenderness present over the patellar tendon. No LCL laxity, MCL laxity, ACL laxity or PCL laxity.     Right lower leg: No edema.      Left lower leg: No edema.   Skin:     Findings: No rash.   Neurological:      General: No focal deficit present.      Mental Status: He is alert and oriented to person, place, and time.          ASSESSMENT AND PLAN:   1. Primary hypertension  Stable, CPM. Due for labs. Continue low sodium diet, regular exercise, monitor BP at home goal <140/90. F/u 6mo    2. Morbid obesity (HCC)  Has gained weight since last OV.   Advised on compliance with medications. Consider GLP1 type of medications if covered through insurance. He will discuss this with wt loss clinic.   Advised to maintain regular exercise, aim for at least 150 min moderate intensity exercise weekly.     3.  Anxiety  Has had some worsening anxiety/depression in this last week d/t recent election results but has been overall controlled. Continue present mgmt. Following with therapy.     4. Recurrent major depressive disorder, in remission (HCC)  See above     5. Laboratory examination ordered as part of a routine general medical examination  - Comp Metabolic Panel (14); Future  - Lipid Panel; Future  - TSH W Reflex To Free T4; Future  - CBC With Differential With Platelet; Future    6. Acute pain of left knee  Suspect patellar tendinopathy vs PFPS.   Advised on RICE therapy, tylenol/nsaids.   Provided w/ exercises.   F/u if no improvement or worsening of sx.     7. Asthma  Mild intermittent. Controlled. No recent exacerbations. ACT 23.     F/u 6mo    Meds & Refills for this Visit:  Requested Prescriptions      No prescriptions requested or ordered in this encounter       Health Maintenance:  Health Maintenance Due   Topic Date Due    Asthma Control Test  Never done    HTN: BP Follow-Up  07/06/2024    COVID-19 Vaccine (5 - 2023-24 season) 09/01/2024    Influenza Vaccine (1) 10/01/2024    Annual Physical  11/15/2024       Patient/Caregiver Education: Patient/Caregiver Education: There are no barriers to learning. Medical education done.   Outcome: Patient verbalizes understanding. Patient is notified to call with any questions, complications, allergies, or worsening or changing symptoms.  Patient is to call with any side effects or complications from the treatments as a result of today.     Problem List:  Patient Active Problem List   Diagnosis    Recurrent major depressive disorder, in remission (HCC)    Anxiety    Attention deficit hyperactivity disorder (ADHD), combined type    Primary hypertension    DAISHA (obstructive sleep apnea)    Morbid obesity (HCC)    Mild intermittent asthma without complication (HCC)    Encounter for therapeutic drug monitoring    Class 3 severe obesity with serious comorbidity and body mass  index (BMI) of 50.0 to 59.9 in adult (HCC)    Elevated LFTs    Daytime hypersomnolence    Generalized anxiety disorder    Panic attacks    Current moderate episode of major depressive disorder without prior episode ([facility])    Tonsillar enlargement          [1]   Allergies  Allergen Reactions    Trazodone SWELLING     his feet    Amoxicillin-Pot Clavulanate DIARRHEA and NAUSEA AND VOMITING

## 2024-11-12 PROBLEM — K76.0 FATTY LIVER DISEASE, NONALCOHOLIC: Status: ACTIVE | Noted: 2024-11-12

## 2024-11-26 ENCOUNTER — OFFICE VISIT (OUTPATIENT)
Dept: FAMILY MEDICINE CLINIC | Facility: CLINIC | Age: 34
End: 2024-11-26
Payer: COMMERCIAL

## 2024-11-26 VITALS
WEIGHT: 315 LBS | HEART RATE: 100 BPM | SYSTOLIC BLOOD PRESSURE: 140 MMHG | OXYGEN SATURATION: 98 % | RESPIRATION RATE: 21 BRPM | BODY MASS INDEX: 40.43 KG/M2 | HEIGHT: 74 IN | DIASTOLIC BLOOD PRESSURE: 90 MMHG

## 2024-11-26 DIAGNOSIS — M54.50 CHRONIC MIDLINE LOW BACK PAIN WITHOUT SCIATICA: Primary | ICD-10-CM

## 2024-11-26 DIAGNOSIS — G89.29 CHRONIC MIDLINE LOW BACK PAIN WITHOUT SCIATICA: Primary | ICD-10-CM

## 2024-11-26 PROCEDURE — 3080F DIAST BP >= 90 MM HG: CPT

## 2024-11-26 PROCEDURE — 99213 OFFICE O/P EST LOW 20 MIN: CPT

## 2024-11-26 PROCEDURE — 3077F SYST BP >= 140 MM HG: CPT

## 2024-11-26 PROCEDURE — 3008F BODY MASS INDEX DOCD: CPT

## 2024-11-26 NOTE — PATIENT INSTRUCTIONS
Continue naproxen as needed for pain  Can use salonpas for pain as well    Provided referral for [facility]

## 2024-11-26 NOTE — PROGRESS NOTES
HPI:   Lakhwinder Ma is a 34 year old male who presents for a lower back pain.   Symptoms for: 2 weeks (began 11/11/24) was doing yardwork and went to put leaves in compost bin and hurt back. Has hurt ever since. Having trouble bending over, going to grocery store, and loading dishwashes. Any type of movement causes pain. Feels low level ache most of the day  Location: low-mid back  Severity: 7/10  Pain radiates to: no  Worsened by: movement  Relief of back pain with: taking inconsistently alleve, naproxen (only naproxen helped)  Prior back pain hx: 6 yrs ago had hip pain and physical therapist dx with back pain   Any recent injury of back?: yes, putting leaves in compost bin  Any red flag symptoms such as:   saddle anesthesia? No   bowel/bladder incontinence? No  Fevers? No  Changes in weight? No    Past Medical History:    Allergic rhinitis    Anxiety    Asthma (HCC)    COVID-19    Depression    Essential hypertension    Hypertension    Obesity    Restless leg syndrome    Sleep apnea    on CPAP at 11 cwp       History reviewed. No pertinent surgical history.    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[employer]   Tobacco Use    Smoking status: Never    Smokeless tobacco: Never   Vaping Use    Vaping status: Never Used   Substance and Sexual Activity    Alcohol use: Yes     Comment: very occasionally    Drug use: Never   Other Topics Concern    Caffeine Concern No    Exercise Yes    Seat Belt Yes    Special Diet No    Stress Concern Yes    Weight Concern Yes        REVIEW OF SYSTEMS:   GENERAL: feels well  otherwise  SKIN: denies any unusual skin lesions  LUNGS: denies shortness of breath with exertion  CARDIOVASCULAR: denies chest pain on exertion  GI: denies abdominal pain,denies heartburn  MUSCULOSKELETAL: no other joints are affected.  Back as specified above.   NEURO: no bowel or fecal incontinences    EXAM:   /90   Pulse 100   Resp 21   Ht 6' 2\" (1.88 m)   Wt (!) 432 lb (196 kg)   SpO2 98%   BMI 55.47 kg/m²    GENERAL: well developed, well nourished,in no apparent distress  SKIN: no rashes,no suspicious lesions  NECK: supple,no adenopathy,no bruits  LUNGS: clear to auscultation  CARDIO: RRR without murmur  GI: good BS's,no masses, HSM or tenderness  EXTREMITIES: no cyanosis, clubbing or edema  BACK:    lumbosacral tenderness, is able to flex 90 degrees. 5/5 strength in bilateral hip flexion, bilateral knee flexion and extension  No reddness, asymmetry, deformity or scoliosis of the spinal column  NEURO: normal sensation to distal extremities.     Straight Leg raise: negative for any pain    ASSESSMENT:   1. Chronic midline low back pain without sciatica  - Physical Therapy Referral - External  Suspect low back muscle strain. No red flag symptoms.   -Imaging: none needed at this time.   -Reviewed return and ED precautions.   Advised Lakhwinder to take naproxen as needed for pain and that he can also use a salonpas patch to help with back pain. I ordered a PT referral at [facility] in [place] for 6 appointments (2 times a week for 3 weeks)    Lakhwinder Ma was advised to go to the emergency room if any groin numbness, bowel/bladder incontinence.    Patient understood above plan and agreed.

## 2024-12-05 ENCOUNTER — PATIENT MESSAGE (OUTPATIENT)
Dept: FAMILY MEDICINE CLINIC | Facility: CLINIC | Age: 34
End: 2024-12-05

## 2024-12-21 DIAGNOSIS — I10 ESSENTIAL HYPERTENSION: ICD-10-CM

## 2024-12-23 RX ORDER — LISINOPRIL AND HYDROCHLOROTHIAZIDE 12.5; 2 MG/1; MG/1
1 TABLET ORAL DAILY
Qty: 90 TABLET | Refills: 1 | Status: SHIPPED | OUTPATIENT
Start: 2024-12-23

## 2025-01-06 ENCOUNTER — OFFICE VISIT (OUTPATIENT)
Dept: SLEEP CENTER | Age: 35
End: 2025-01-06
Attending: INTERNAL MEDICINE
Payer: COMMERCIAL

## 2025-01-06 DIAGNOSIS — G47.33 OSA (OBSTRUCTIVE SLEEP APNEA): Primary | ICD-10-CM

## 2025-01-06 PROCEDURE — 95811 POLYSOM 6/>YRS CPAP 4/> PARM: CPT

## 2025-01-08 ENCOUNTER — SLEEP STUDY (OUTPATIENT)
Facility: CLINIC | Age: 35
End: 2025-01-08
Payer: COMMERCIAL

## 2025-01-08 DIAGNOSIS — G47.33 OBSTRUCTIVE SLEEP APNEA: Primary | ICD-10-CM

## 2025-01-08 PROCEDURE — 95811 POLYSOM 6/>YRS CPAP 4/> PARM: CPT | Performed by: INTERNAL MEDICINE

## 2025-01-16 ENCOUNTER — OFFICE VISIT (OUTPATIENT)
Dept: INTERNAL MEDICINE CLINIC | Facility: CLINIC | Age: 35
End: 2025-01-16
Payer: COMMERCIAL

## 2025-01-16 DIAGNOSIS — I10 PRIMARY HYPERTENSION: Primary | ICD-10-CM

## 2025-01-16 DIAGNOSIS — F32.A ANXIETY AND DEPRESSION: ICD-10-CM

## 2025-01-16 DIAGNOSIS — E66.01 CLASS 3 SEVERE OBESITY WITH SERIOUS COMORBIDITY AND BODY MASS INDEX (BMI) OF 50.0 TO 59.9 IN ADULT, UNSPECIFIED OBESITY TYPE (HCC): ICD-10-CM

## 2025-01-16 DIAGNOSIS — Z51.81 ENCOUNTER FOR THERAPEUTIC DRUG MONITORING: ICD-10-CM

## 2025-01-16 DIAGNOSIS — E66.813 CLASS 3 SEVERE OBESITY WITH SERIOUS COMORBIDITY AND BODY MASS INDEX (BMI) OF 50.0 TO 59.9 IN ADULT, UNSPECIFIED OBESITY TYPE (HCC): ICD-10-CM

## 2025-01-16 DIAGNOSIS — R63.8 CRAVING FOR PARTICULAR FOOD: ICD-10-CM

## 2025-01-16 DIAGNOSIS — F41.9 ANXIETY AND DEPRESSION: ICD-10-CM

## 2025-01-16 DIAGNOSIS — G47.33 OSA ON CPAP: ICD-10-CM

## 2025-01-16 DIAGNOSIS — R63.2 BINGE EATING: ICD-10-CM

## 2025-01-16 PROCEDURE — 3008F BODY MASS INDEX DOCD: CPT | Performed by: NURSE PRACTITIONER

## 2025-01-16 PROCEDURE — 99214 OFFICE O/P EST MOD 30 MIN: CPT | Performed by: NURSE PRACTITIONER

## 2025-01-16 PROCEDURE — 3077F SYST BP >= 140 MM HG: CPT | Performed by: NURSE PRACTITIONER

## 2025-01-16 PROCEDURE — 3079F DIAST BP 80-89 MM HG: CPT | Performed by: NURSE PRACTITIONER

## 2025-01-16 RX ORDER — TIRZEPATIDE 5 MG/.5ML
5 INJECTION, SOLUTION SUBCUTANEOUS WEEKLY
Qty: 2 ML | Refills: 2 | Status: SHIPPED | OUTPATIENT
Start: 2025-01-16

## 2025-01-16 RX ORDER — TIRZEPATIDE 2.5 MG/.5ML
2.5 INJECTION, SOLUTION SUBCUTANEOUS WEEKLY
Qty: 2 ML | Refills: 0 | Status: SHIPPED | OUTPATIENT
Start: 2025-01-16

## 2025-01-16 RX ORDER — NALTREXONE HYDROCHLORIDE 50 MG/1
50 TABLET, FILM COATED ORAL
Qty: 90 TABLET | Refills: 1 | Status: CANCELLED | OUTPATIENT
Start: 2025-01-16

## 2025-01-16 RX ORDER — TOPIRAMATE 25 MG/1
25 TABLET, FILM COATED ORAL 2 TIMES DAILY
Qty: 180 TABLET | Refills: 1 | Status: CANCELLED | OUTPATIENT
Start: 2025-01-16

## 2025-01-16 NOTE — PROGRESS NOTES
Lakhwinder Ma is a 34 year old male presents today for follow-up on medical weight loss program for the treatment of overweight, obesity, or morbid obesity with associated HTN, DAISHA.    S:  Current weight   Wt Readings from Last 6 Encounters:   01/16/25 (!) 421 lb (191 kg)   11/26/24 (!) 432 lb (196 kg)   11/08/24 (!) 426 lb (193.2 kg)   06/06/24 (!) 405 lb (183.7 kg)   05/15/24 (!) 404 lb (183.3 kg)   02/19/24 (!) 395 lb (179.2 kg)    AND BMI Body mass index is 53.33 kg/m²..    Patient has lost -26# since LOV via VV 4 month ago and in clinic in 2/2024. He has been inconsistent with medication. Increased stress resulting in stress eating after break up in 11/2024. Hx of DAISHA with recent CPAP titration earlier this month, reviewed in EMR. Unable to view original sleep study report done in [place] reported in 2020. Patient started with new insurance since LOV.    Testing/consult completed since LOV: Dietician: 10/2023 with Lesvia: Estimated caloric needs for weight loss: ~2700 cals/d for 1-2 pounds/week weight loss.    Labs: WNL    [facility] Follow Up    Question 1/15/2025 10:35 AM CST - Filed by Patient   Please describe a success moment: Not sure I've had one   Please describe a challenging moment/needs for improvement: Still struggling with stress eating and boredom eating   Please complete this 24 hour food journal, listing everything you had to eat in the past day. Include the average time of day you ate these meals at    List foods, qty and prep for breakfast:    List foods, qty and prep for lunch.    List foods, qty and prep for dinner.    List foods, qty and prep for snacks.    List the types and qty of fluids consumed    On average, how many meals did you eat out per week?    Exercise    How many days per week are you active or exercise    On average, how many days were anaerobic (strength/resistance) exercises performed?    On average, how many days were aerobic (cardio) exercises performed?     Perceived level of exertion on a scale of 1-5, with 5 being very intense:    Stress    Average stress level on a scale of 1-10, with 10 being extremely stressed: 6   If greater than 5/1O how would you grade your coping mechanisms? fair   Sleep hours and integrity    How many hours of uninterrupted sleep do you get a night: 8   Do you feel rested in the morning: Yes   If no, what may have been disrupting your sleep? Using CPAP   Please list any goal(s) for your next visit Get back into the routine, discuss better methods of staying on track       Social hx and PMH reviewed. Employed from home.  with school age son.    REVIEW OF SYSTEMS:  GENERAL: feels well otherwise  LUNGS: denies shortness of breath with exertion  CARDIOVASCULAR: denies chest pain on exertion, denies palpitations or pedal edema  GI: denies abdominal pain.  No N/V/D/C  MUSCULOSKELETAL: no acute joint or muscle pain  NEURO: denies headaches  PSYCH: denies change in behavior or mood, denies feeling sad or depressed.     EXAM:  /88   Pulse 80   Resp 18   Ht 6' 2.5\" (1.892 m)   Wt (!) 421 lb (191 kg)   BMI 53.33 kg/m²    GENERAL: well developed, well nourished, in no apparent distress, morbidly obese  EYES: conjunctiva pink, sclera non icteric, PERRLA  LUNGS: CTA in all fields, breathing non labored  CARDIO: RRR without murmur, normal S1 and S2 without clicks or gallops, no pedal edema.  GI: +BS  NEURO/MS: motor and sensory grossly intact  PSYCH: pleasant, cooperative, normal mood and affect    ASSESSMENT AND PLAN:  Reviewed Initial Weight Data and Goal Weight Loss:       Encounter Diagnoses   Name Primary?    Encounter for therapeutic drug monitoring     DAISHA on CPAP     Class 3 severe obesity with serious comorbidity and body mass index (BMI) of 50.0 to 59.9 in adult, unspecified obesity type (HCC)     Primary hypertension Yes    Craving for particular food     Anxiety and depression     Binge eating        No orders of the defined  types were placed in this encounter.      Meds & Refills for this Visit:  Requested Prescriptions     Signed Prescriptions Disp Refills    Tirzepatide-Weight Management (ZEPBOUND) 2.5 MG/0.5ML Subcutaneous Solution Auto-injector 2 mL 0     Sig: Inject 2.5 mg into the skin once a week.    Tirzepatide-Weight Management (ZEPBOUND) 5 MG/0.5ML Subcutaneous Solution Auto-injector 2 mL 2     Sig: Inject 5 mg into the skin once a week. Start after completing full 4 weeks on 2.5 mg weekly dose.       Imaging & Consults:  OP REFERRAL TO [facility]      Plan:  Patient has lost -26# since LOV in 2/2024 on Naltrexone 50 mg daily with a total weight loss of -2# since initial consult on 9/28/23 with initial weight of 419#.  Labs reviewed. Weight loss goal: lose 303 in 6 months and 60# in 1 year.  CPM, add Zepbound for DAISHA tx. Hx of no AOM coverage in the past. Metformin ER with SEs of nausea.  on impact of stress on weight, self monitoring, establishing a healthy food environment. Refer for counseling. See patient instructions below for additional plans and patient counseling.      Patient Instructions   Continue making lifestyle changes that focus on good nutrition, regular exercise and stress management.    Medication Plan: Continue current medication regimen. Retry Zepbound at 2.5 mg weekly. After 4 weeks increase to the next dose of 5 mg weekly. If <5# weight loss on 5 mg weekly dose after 30 days, then send LiveDeal message with current scale weight to determine if a dose adjustment is appropriate. Otherwise plan to maintain this dose until next visit. Any further dose titrations beyond this dose will be considered at appointments only, unless otherwise discussed. Visit the website [email or web address]Document[employer] and click on Consumers for additional details, savings, and further dosing instructions. This medication may require a prior authorization (PA) by your insurance. A PA may take one week plus to complete and our office  will be in touch during this process if needed. If cost/supply prohibitive plan: contact office.    Zepbound is being prescribed for dual FDA indication of sleep apnea and obesity treatment.    Tips while taking an injectable medication:    Be an intuitive eater. Listen to your hunger and fullness signals, stopping when you are full.  Consume protein and produce in your day, striving for a rainbow of color of produce.  Reduce portions to starting size of 1 cup and check in with your gut to see if you are full. Use a sand timer to slow down your eating pace to allow for 15-20 minutes to complete a meal and use the \"2 bite rule\".  Reduce refined sugars and high fat foods, as they may contribute to greater side effects of nausea and heartburn.  Stop eating 3 hours before bedtime to allow your food to digest.  Remain hydrated with water or non caloric and non caffeine beverages.  Use over the counter sara lozenge/supplement to help reduce nausea if needed.  If you have been off your medication for more than 2 weeks please notify our office to determine next dosing, as a return to previous dose may not be appropriate or tolerated.    Next steps to work on before next office visit include: Recommend follow up with Kristal our dietician. Set up a healthy food environment for success! Meal prep/plan to ease food choices. Track your nutrition to provide feedback of what you are fueling your body with. Anticipate contact for counseling services to support mindful eating practices.    I recommend self monitoring to support behavior change and to learn how your environment individually impacts YOUR body. This will help promote intuitive eating practices. Goal is to track nutrition 2x/week via paper log, [employer] Karen or LoseIT! Karen, and log weekly home scale weight. Support via our counseling and dietician teams are available with a referral. Please let me know if this individualized care is desired.      Self-Monitoring - The  Way to Successful Weight Management    By Sera Quintero RD, DARRELL, Maida Delgado RD, DARRELL, Nasim Carrasco PA-C, and Jaclyn Perry MD    OAC [email or web address] Winter 2008 Resource    One major and possibly most important behavioral interventional strategy for weight management and lifestyle change is self-monitoring. Behavioral interventions are a central aspect in treatments to promote lifestyle changes that lead to weight-loss, prevent weight gain or weight regain and improve physical fitness. In the past, self-monitoring has unfortunately been one of the least popular techniques for those in weight management programs, and in some cases it is even thought of as a punishment. Because self-monitoring is critical for success with lifestyle changes, it is important to look at the various self monitoring techniques.    What is Self-monitoring?  Self-monitoring refers to the observing and recording of eating and exercise patterns, followed by feedback on the behaviors. The goal of self-monitoring is to increase self-awareness of target behaviors and outcomes, thus it can serve as an early warning system if problems are arising and can help track success. Some commonly used self-monitoring techniques include:    Food diaries  Regular self-weighing  Exercise logs  Equipment such as pedometers, accelerometers and metabolic devices  Food Logs and Diaries  One of the most common and important types of self-monitoring strategies in weight management programs is keeping a food log, in which individuals record foods, exercises or beverages as soon as they are consumed.    One important technique with food logs is individuals recording what they eat or drink as it is consumed; otherwise it may not give an accurate account of the day’s intake. A good “rule of thumb” for food logs is: “if you bite it, you write it!”    The minimum information for weight-loss that should be kept in food logs is type, amount and caloric content  of food or beverage consumed. This provides the ability to track and balance the number of calories consumed throughout the day with the amount of calories expended throughout the day.    Other nutritional information that can be logged includes: time of day of eating, fat content and carbohydrate grams. Disease-specific food logs can also be kept. For example: focusing on carbohydrate content instead of calories for patients with diabetes or insulin resistance.    Food Diaries  Another helpful tool in self-monitoring is keeping a food diary. Food diaries differ from food logs because they include more detailed information. They are useful if you are trying to find behavioral reasons or psychological aspects for eating.    Depending on the person and behavioral complexities involved, some food diaries could include the stress level, mood or feelings surrounding eating, activity or location or other environmental or emotional triggers for eating. The more complex or detailed, the better the feedback.    However, in today’s society it is almost impossible for most people to keep highly detailed daily food records over the long-term, therefore, compliance is often very low with detailed food diaries. By suggesting that patients keep a detailed food record for a few days each week, perhaps major areas of focus for nutritional and behavioral intervention can be recognized.    Logging Your Food Online  Online food logs and diaries or computer software are quick and convenient ways to keep records of foods consumed in our technologically advanced world. Many Web sites are available for tracking of foods and calories throughout the day, some of which are free and very easy to use.    You can look up food choices and/or alternative choices in online databases of more than 50,000 foods. Internet-savvy loggers may choose to keep their journals online. Others may just choose to use these databases as a more convenient way of  looking up nutritional value of foods. Some free online diaries include:    Free Web sites for searching nutritional information are available, an example is [email or web address] [email or web address]. These Web sites may also offer exercise tracking and ideas, support, motivational tips and chat or discussion rooms.    Hand-held Calorie Counters  Another option for those who are “on the go” are handheld devices for calorie counting. Some of the devices are stand-alone such as CalorieSmart® or PostHelpersCounter®. Others need to connect to Web sites. Other devices are installed in your Palm or Pocket-PC such as Diet Diary by [employer]. They let you download updates when nutrition facts change, however, some of them use a lot of memory.    Regular Weighing  Weighing yourself is an important and simple self-monitoring behavior to serve as reminder of one’s eating and physical activity habits. Although it may be hard and sometimes discouraging to weigh yourself while losing weight, it is recommended to weigh yourself weekly, preferably outside of the home on the same scale.    Using the scale at the local gym or exercise facility or your doctor’s office may be more accurate than home scales. However, if this is unrealistic, it is okay to use a home scale. Try to weigh yourself at the same time of day and the same day of the week.    Writing down your weekly weights on a table, graph or calendar can help you keep track of your success or to help you get back on track more quickly. It is important to note that weighing yourself more frequently than weekly is not recommended, as day to day fluctuations are not indicators of actual weight. Regular monitoring of your weight is also essential to help you maintain your weight after losing weight.    Exercise Logs  Another self-monitoring technique, along the same lines as food logs and diaries, is keeping an exercise log or diary. The number of minutes engaged and type and level of exertion of  physical activity should ideally be recorded.    An important and often forgotten aspect of exercise logs is the level of perceived exertion. Walking for 30 minutes, at an easy compared to a hard pace, will result in different levels of calories burned and cardiovascular impact.    Typically, an easy physical activity that does not increase heart rate much, or alter breathing would usually be the pace that you walk around work or go shopping. Moderate level of physical exertion is when you are getting a mildly increased heart and breathing rate. Heavy or hard level of physical exertion would be sweating, increased heart rate (target heart rate range) as well as increased breathing.    Remember, physical activity can be done at one time or intermittently throughout the day. Logging exercise can be a positive feedback or a reminder to incorporate more exercise or physical activity into your daily routine.    Initial activities may be walking, riding a stationary bike or swimming at a slow pace. Other types of exercise that can be fun are dancing, exercise videos or chair exercises. You should try to aim for 30 minutes of exercise on most days of the week.    Many people try to start out with exercise on three or four days of the week. However, if you can get yourself exercising most to all days of the week, even if only for 10 or 15 minutes, it will become more of a routine for you.    Healthy Lifestyle Tip  All adults should set a long-term goal to accumulate at least 30 minutes or more of moderate-intensity physical activity on most to all days of the week. Also, try to increase activities of daily living such as taking the stairs instead of the elevator, parking further away or walking to a bathroom that is further from your desk. Reducing sedentary time is a good strategy to increase activity by undertaking frequent, less strenuous activities. With time, you may be able to engage in more strenuous  activities.    Pedometers  Self-monitoring tools are becoming more and more popular and accurate. One of the simplest of these self-monitoring tools is a pedometer. Pedometers give objective data of physical activity throughout the day. Pedometers can be found in almost any consumer catalog or retail store.    Some of the more popular manufactures include Digi-Walker, [employer]ron, AcVirtuatan, Stockbet.com, Rootless, Preact, Freestyle, food.de, Cortexyme and many others. [employer] and [employer] make pedometer of speedometer devices that calculate steps and speed using GPS. These clip-on devices are inexpensive, ranging from less than $15 up to $75.    Many people get an average of 3,000 steps per day with daily activity. In order to burn off extra calories for weight-loss, walking 10,000 steps per day is recommended. For regular health, a minimum of 6,000 steps per day is required. Research suggests that a deliberate walk of 4,000-6,000 steps will help with weight-loss. It is often a good idea to keep track of your daily steps taken in your exercise log.    Pedometers can be frustrating for those who are more interested in distance traveled. Focusing on the number of steps and ways to incorporate more steps throughout the day will make as much of a difference with weight-loss as actual distance does. Pedometers encourage people to find ways to add more steps throughout the day.    Because step counting is becoming more popular, advances are being made in the technology behind pedometers. New pedometers display steps and count them accurately. They are meant to be worn everyday and all day, as motivation to keep stepping, Most are small and comfortable to wear.    Pedometers sense your body motion, counting your footsteps usually by a turned pendulum technology, a coiled spring mechanism and a hairspring mechanism (which is the least accurate). The unit should be accurate in its count when you wear it correctly. You may  need to experiment with where to wear it. You can measure your stride and then the pedometer can estimate distance traveled.    Some pedometers today offer multifunction options like calorie estimates, clocks, timers, stopwatches, speed estimators, seven-day memory or pulse rate readers, voice feedback and radios.    Accelerometers  Although pedometers are very cost-effective, one of the main flaws in using pedometers, however, is that they do not record intensity (how hard) or duration (how long) or frequency (how often) movement occurs. Accelerometers are devices that can objectively measure frequency, duration and intensity of physical activity.    Accelerometers provide a high level of accuracy when assessing physical activity. There are a variety of commercially available accelerometers, or activity monitors, which come in a wide range of prices anywhere from $50 to $1,000. Valdermrainer and Nike are examples of affordable accelerometers.    Many of the more expensive accelerometers are used only in research or as a part of a hospital-based program. These monitors are more complex than pedometers in that they display and store more complex data. Some are designed to download to a computer for analysis of intensity levels, movements and physical activity patterns. They can also be used to estimate calories burned or energy expenditure.    Accelerometers have sophisticated sensors that convert physical movement into an electrical signal that is relative to the muscular force needed to produce the work. Accelerometers can be found in uniaxial or triaxial measures. Uniaxial accelerometers measure in a single plane and can be attached to the trunk or limbs. Triaxial accelerometers measure along three planes: vertical, medial-lateral and anterior-posterior.    Although accelerometers are a step up from pedometers in accuracy of physical activity, they cannot register resistance. Therefore, if you are strength training  or adding resistance to your bike or treadmill or adding an incline to your walking, it will not be able to discern the added level of energy required to do that work.    Metabolic Devices  One of the most accurate and most expensive tools for self-monitoring are tools that have very sophisticated monitoring and interpreting sensors for calories burned. Many of these devices have options to subscribe to a Web-based calorie counter system that integrates the amount of calories burned measured by the equipment and your calories consumed that you enter in easy to use food logs.    These devices are more accurate in measurements of calories expended because they use not only accelerometer technology, but also heat flux sensors, galvanic skin response (to measure physical exertion and emotional stimuli) and skin temperature gauges. Some also include heart rate monitoring techniques. All of these technologies combined lead to a very accurate measurement of calories expended throughout the day.    These devices can determine if you are sitting, sleeping, jogging, walking, lifting weights or riding in a car. Many of these devices are very expensive and used primarily for research, however, some are available commercially.    This technology is also employed by hospital-based programs. Patients wear the hospital’s armband and track their nutrition on the Web site or computer-based program for typically one to two weeks. When they return to the clinic, the information will be uploaded and the practitioners will be able to work with the patients with objective data on metabolic lifestyle patterns.    Practitioners can also monitor patients on integrated software applications to provide consultations without being face to face. Practitioners have the ability to set daily goals to tailor programs to the individual patient. These are great tools to help objectively monitor behavior and physical activity, as well as providing real  time feed back to the patient. [employer] is one company that offers this technology.    Conclusion  Although specific diseases and treatments vary, behavior modification is the major key in weight-loss or prevention and decreases the risk of diseases. Self-monitoring is a key to behavior modifications, and there are a multitude of ways to self-monitor. With technology advancements, self-monitoring techniques are changing and improving to help defeat some of the major barriers to compliance. The bottom line is that no matter how you do it, self-monitoring should be an important part of your weight-loss, weight maintenance or healthy lifestyle change. Then, the next step is to be sure the self-monitoring translates into positive behavior changes with regards to diet and exercise.    Set your food environment up for success with tips listed below to help support your health journey and provide less temptations!  How to Create a Healthy Food Environment in Your Home  December 28, 2020  Posted in Blog, Nutrition  By [employer]    Why create a healthy food environment in your home? Every day, decisions we make about food are influenced by hundreds of factors, many of them subconscious. For example, an open box of cookies on the counter is more tempting than an apple in your crisper.  Having a healthy food environment at home will make it easier to choose healthy options and stay on track with habits that support your health goals.  Here are a few steps you can take to set up your home for success.  How to Build a Healthy Food Environment  1 - Clear Your Home of Less-healthy Food  These foods are okay in moderation, but they can be enjoyed outside of the house in a fun and empowering way. Go through your kitchen fridge, freezer, pantry, cabinets and counters to toss out foods that aren't helping you meet your health goals.  2 - Put Healthy Foods in Plain Sight  Keep grab-n-go fruit on the counter instead  of snack foods so they are easily accessible. Consider moving your refrigerated fruit to a shelf at eye-level instead of keeping it in the crisper. Vice versa, move less healthy foods to the crisper so they aren't always in your line of sight.  3 - Find Healthy Substitutions  Feeling deprived of your favorite foods can make you miserable and resentful. Instead, experiment with healthier alternatives. Here are some examples:  Calorie-controlled frozen yogurt bars like [place] vs high-calorie ice cream novelties  Sparkling water like Bubbly vs soda or fruit juice  Skinny popcorn vs chips and crackers  Dessert hummus vs pudding cups or ice cream  4 - Clean and Organize  A messy kitchen can make it hard to find healthy choices when you are hungry or short on time. It also makes it hard to locate whether or not you need something from the grocery store. Give your kitchen a new year makeover and organize your food storage areas so you can always locate what you need without distraction.  5 - Eat at Your Kitchen Table  Eating at your kitchen table without distractions is a way to eat more mindfully and enjoy time with your family. Eating on the couch while watching shows or movies can encourage mindless eating and consuming more calories than intended.  6 - Keep Healthy Foods Readily Available  When you are hungry and in a pinch, it's important to have healthy foods on-hand so you don't make hunger-controlled choices. This means keeping ready-to-go-snack foods in your kitchen such as fruit paired with nut butter, hummus and carrots, lunch meat and cheese roll-ups, etc. It may also help to keep pre-made freezer meals on-hand (something you've cooked in advance) for busy weeknights when you don't have time to prepare food.  Turning your home into a healthy food environment can set you up for success and confident decision-making. Plus, it feels good to be organized and empowered!  Why We Gain Weight When We’re Stressed--And  How Not To  The psychology and biology of stress-related overeating and weight gain   Emotional Eating under Stress  Have you ever found yourself mindlessly eating a tub of ice cream while you brood about your latest romantic rejection or eating a hamburger and fries in front of your computer as you furiously try to make a work deadline? Perhaps you’re a busy mom, eating cookies in your car as you shuttle the kids back and forth to a slew of activities. Or you’re a small business owner desperately trying to make ends meet when you suddenly realize your waistline has expanded. If you recognize yourself in any of these scenarios, you’re not alone and it’s probably not your fault. Stress that goes on for a long period is a triple whammy for weight--it increases our appetites, makes us hold onto the fat, and interferes with our willpower to implement a healthy lifestyle.  Below are the four major reasons stress leads to weight gain and four great research-based coping strategies you can use to fight back.  Hormones  When your brain detects the presence of a threat, no matter if it is a snake in the grass, a grumpy boss, or a big credit card bill, it triggers the release of a cascade of chemicals, including adrenaline, CRH, and cortisol. Your brain and body prepare to handle the threat by making you feel alert, ready for action and able to withstand an injury. In the short-term, adrenaline helps you feel less hungry as your blood flows away from the internal organs and to your large muscles to prepare for “fight or flight.” However, once the effects of adrenaline wear off, cortisol, known as the “stress hormone,” hangs around and starts signaling the body to replenish your food supply. Fighting off wild animals, like our ancestors did, used up a lot of energy, so their bodies needed more stores of fat and glucose. Today’s human, who sits on the couch worrying about how to pay the bill or works long hours at the computer  to make the deadline, does not work off much energy at all dealing with the stressor! Unfortunately, we are stuck with a neuroendocrine system that didn’t get the update, so your brain is still going to tell you to reach for that plate of cookies anyway.  Belly Fat  In the days when our ancestors were fighting off tigers and famine, their bodies adapted by learning to store fat supplies for the long haul. The unfortunate result for you and me is that when we are chronically stressed by life crises and work-life demands, we are prone to getting an extra layer of “visceral fat” deep in our bellies. Your belly has an ample supply of blood vessels and cortisol receptors to make the whole process flow more efficiently. The downside is that excess belly fat is unhealthy and difficult to get rid of. The fat releases chemicals triggering inflammation, which increases the likelihood that we will develop heart disease or diabetes. And it can make it more difficult to fit into those massimo jeans you splurged on, leading to more stress about money wasted! Unfortunately, excess cortisol also slows down your metabolism, because your body wants to maintain an adequate supply of glucose for all that hard mental and physical work dealing with the threat.  Anxiety  When we have a surge of adrenaline as part of our fight/flight response, we get fidgety and activated. Adrenaline is the reason for the “wired up” feeling we get when we’re stressed. While we may burn off some extra calories fidgeting or running around cleaning because we can’t sit still, anxiety can also trigger “emotional eating.” Overeating or eating unhealthy foods in response to stress or as a way to calm down is a very common response. In the most recent [employer]’s “Stress in [place]:” survey, a whopping 40% of respondents reported dealing with stress in this way, while 42% reported watching television for more than 2 hours a day to deal  with stress. Being a couch potato also increases the temptation to overeat and is inactive, which means that those extra calories aren’t getting burned off. Anxiety can also make you eat more “mindlessly” as you churn around worrying thoughts in your head, not even focusing on the taste of the food, how much you’ve eaten, or when you are feeling full. When you eat mindlessly, you will likely eat more, yet feel less satisfied.  Cravings and Fast Food  When we are chronically stressed, we crave “comfort foods,” such as a bag of potato chips or a tub of ice cream. These foods tend to be easy to eat, highly processed, and high in fat, sugar, or salt. We crave these foods for both biological and psychological reasons. Stress may mess up our brain’s reward system or cortisol may cause us to crave more fat and sugar. We also may have memories from childhood, such as the smell of freshly baked cookies,, that lead us to associate sweet foods with comfort. When we are stressed, we also may be more likely to drive through the Fast Food place, rather than taking the time and mental energy to plan and cook a meal. Americans are less likely to cook and eat dinner at home than people from many other countries, and they also work more hours. Working in urban areas may mean long, jammed commutes, which both increase stress and interfere with willpower because we are hungrier when we get home later. A [facility] research study showed, in laboratory mice, that being “stressed” by exposure to the smell of a predator lead the mice to eat more high-fat food pellets, when given the choice of eating these instead of normal feed.  Less Sleep  Do you ever lie awake at night worrying about paying the bills or about who will watch your kids when you have to go to work? According to the APA’s “Stress in [place]” survey, more than 40% of us lie awake at night as a result of stress. Research shows that worry is a major cause of  insomnia. Our minds are overactive and won’t switch off. We may also lose sleep because of pulling overnights to cram for exams or writing until the early hours. Stress causes decreased blood sugar, which leads to fatigue. If you drink coffee or caffeinated soft drinks to stay awake, or alcohol to feel better, your sleep cycle will be even more disrupted. Sleep is also a powerful factor influencing weight gain or loss. Lack of sleep may disrupt the functioning of ghrelin and leptin--chemicals that control appetite. We also crave carbs when we are tired or grumpy from lack of sleep. Finally, not getting our gerda zzzz’s erodes our willpower and ability to resist temptation. In one study, overweight/obese dieters were asked to follow a fixed calorie diet and assigned to get either 5 and a half or eight and a half hours of sleep a night (in a sleep lab). Those with sleep deprivation lost substantially less weight.  How to Minimize Weight Gain When You’re Stressed  Exercise  Aerobic exercise has a one-two punch. It can decrease cortisol and trigger release of chemicals that relieve pain and improve mood. It can also help speed your metabolism so you burn off the extra indulgences  Eat Mindfully  Mindful Eating programs train you in meditation, which helps you cope with stress, and change your consciousness around eating. You learn to slow down and tune in to your sensory experience of the food, including its sight, texture or smell. You also learn to tune into your subjective feelings of hunger or fullness, rather than eating just because it’s a mealtime or because there is food in front of you. A well-designed study of binge-eaters showed that participating in a Mindful Eating program led to fewer binges and reduced depression.  Find Rewarding Activities Unrelated to Food  Taking a hike, reading a book, going to a yoga class, getting a massage, patting your dog, or making time for friends and family can help to  relieve stress without adding on the pounds. Although you may feel that you don’t have time for leisure activities with looming deadlines, taking time to relieve stress helps you to feel refreshed, lets you think more clearly, and improves your mood, so you are less likely to overeat.  Write in a Journal  Writing down your experiences and reactions or your most important goals keeps your hands busy and your mind occupied, so you’re less likely to snack on unhealthy foods. Writing can give you insight into why you’re feeling so stressed and highlight ways of thinking or expectations of yourself that may be increasing the pressure you feel. Writing down your healthy eating and exercise goals may make you more conscious of your desire to live a healthier lifestyle and intensify your commitment. Research studies have also shown that writing expressively or about life goals can improve both mood and health.    Source: Psychology Today  Date: Posted Aug 28, 2013   Author: Paris Silveira, Ph.D        Medication use and SEs reviewed with patient.    Return in about 4 months (around 5/16/2025) for weight management via Telemedicine Visit and in clinic in August.    Patient verbalizes understanding.    DOCUMENTATION OF TIME SPENT: Code selection for this visit was based on time spent : 30 minutes on date of service in preparing to see the patient, obtaining and/or reviewing separately obtained history, performing a medically appropriate examination, counseling and educating the patient/family/caregiver, ordering medications or testing, referring and communicating with other healthcare providers, documenting clinical information in the electronic medical record, independently interpreting results and communicating results to the patient/family/caregiver and care coordination with the patient's other providers.      Answers submitted by the patient for this visit:  Medical Weight Loss Follow Up (Submitted on 1/15/2025)  If  greater than 5/1O how would you grade your coping mechanisms?: fair

## 2025-01-16 NOTE — PATIENT INSTRUCTIONS
Continue making lifestyle changes that focus on good nutrition, regular exercise and stress management.    Medication Plan: Continue current medication regimen. Retry Zepbound at 2.5 mg weekly. After 4 weeks increase to the next dose of 5 mg weekly. If <5# weight loss on 5 mg weekly dose after 30 days, then send ttwick message with current scale weight to determine if a dose adjustment is appropriate. Otherwise plan to maintain this dose until next visit. Any further dose titrations beyond this dose will be considered at appointments only, unless otherwise discussed. Visit the website [email or web address] and click on Consumers for additional details, savings, and further dosing instructions. This medication may require a prior authorization (PA) by your insurance. A PA may take one week plus to complete and our office will be in touch during this process if needed. If cost/supply prohibitive plan: contact office.    Zepbound is being prescribed for dual FDA indication of sleep apnea and obesity treatment.    Tips while taking an injectable medication:    Be an intuitive eater. Listen to your hunger and fullness signals, stopping when you are full.  Consume protein and produce in your day, striving for a rainbow of color of produce.  Reduce portions to starting size of 1 cup and check in with your gut to see if you are full. Use a sand timer to slow down your eating pace to allow for 15-20 minutes to complete a meal and use the \"2 bite rule\".  Reduce refined sugars and high fat foods, as they may contribute to greater side effects of nausea and heartburn.  Stop eating 3 hours before bedtime to allow your food to digest.  Remain hydrated with water or non caloric and non caffeine beverages.  Use over the counter sara lozenge/supplement to help reduce nausea if needed.  If you have been off your medication for more than 2 weeks please notify our office to determine next dosing, as a return to previous dose may  not be appropriate or tolerated.    Next steps to work on before next office visit include: Recommend follow up with Kristal, our dietician. Set up a healthy food environment for success! Meal prep/plan to ease food choices. Track your nutrition to provide feedback of what you are fueling your body with. Anticipate contact for counseling services to support mindful eating practices.    I recommend self monitoring to support behavior change and to learn how your environment individually impacts YOUR body. This will help promote intuitive eating practices. Goal is to track nutrition 2x/week via paper log, [employer] Karen or [employer] Karen, and log weekly home scale weight. Support via our counseling and dietician teams are available with a referral. Please let me know if this individualized care is desired.      Self-Monitoring - The Way to Successful Weight Management    By Sera Quintero RD, LDN, Maida Delgado RD, LDN, Nasim Carrasco PA-C, and Jaclyn Perry MD    [facility] [email or web address] Winter 2008 Resource    One major and possibly most important behavioral interventional strategy for weight management and lifestyle change is self-monitoring. Behavioral interventions are a central aspect in treatments to promote lifestyle changes that lead to weight-loss, prevent weight gain or weight regain and improve physical fitness. In the past, self-monitoring has unfortunately been one of the least popular techniques for those in weight management programs, and in some cases it is even thought of as a punishment. Because self-monitoring is critical for success with lifestyle changes, it is important to look at the various self monitoring techniques.    What is Self-monitoring?  Self-monitoring refers to the observing and recording of eating and exercise patterns, followed by feedback on the behaviors. The goal of self-monitoring is to increase self-awareness of target behaviors and outcomes, thus it can serve as an early warning  system if problems are arising and can help track success. Some commonly used self-monitoring techniques include:    Food diaries  Regular self-weighing  Exercise logs  Equipment such as pedometers, accelerometers and metabolic devices  Food Logs and Diaries  One of the most common and important types of self-monitoring strategies in weight management programs is keeping a food log, in which individuals record foods, exercises or beverages as soon as they are consumed.    One important technique with food logs is individuals recording what they eat or drink as it is consumed; otherwise it may not give an accurate account of the day’s intake. A good “rule of thumb” for food logs is: “if you bite it, you write it!”    The minimum information for weight-loss that should be kept in food logs is type, amount and caloric content of food or beverage consumed. This provides the ability to track and balance the number of calories consumed throughout the day with the amount of calories expended throughout the day.    Other nutritional information that can be logged includes: time of day of eating, fat content and carbohydrate grams. Disease-specific food logs can also be kept. For example: focusing on carbohydrate content instead of calories for patients with diabetes or insulin resistance.    Food Diaries  Another helpful tool in self-monitoring is keeping a food diary. Food diaries differ from food logs because they include more detailed information. They are useful if you are trying to find behavioral reasons or psychological aspects for eating.    Depending on the person and behavioral complexities involved, some food diaries could include the stress level, mood or feelings surrounding eating, activity or location or other environmental or emotional triggers for eating. The more complex or detailed, the better the feedback.    However, in today’s society it is almost impossible for most people to keep highly detailed  daily food records over the long-term, therefore, compliance is often very low with detailed food diaries. By suggesting that patients keep a detailed food record for a few days each week, perhaps major areas of focus for nutritional and behavioral intervention can be recognized.    Logging Your Food Online  Online food logs and diaries or computer software are quick and convenient ways to keep records of foods consumed in our technologically advanced world. Many Web sites are available for tracking of foods and calories throughout the day, some of which are free and very easy to use.    You can look up food choices and/or alternative choices in online databases of more than 50,000 foods. Internet-savvy loggers may choose to keep their journals online. Others may just choose to use these databases as a more convenient way of looking up nutritional value of foods. Some free online diaries include:    Free Web sites for searching nutritional information are available, an example is www.Viropro. These Web sites may also offer exercise tracking and ideas, support, motivational tips and chat or discussion rooms.    Hand-held Calorie Counters  Another option for those who are “on the go” are handheld devices for calorie counting. Some of the devices are stand-alone such as CalorieSmart® or HealthFitCounter®. Others need to connect to Web sites. Other devices are installed in your Palm or Pocket-PC such as Diet Diary by [employer]. They let you download updates when nutrition facts change, however, some of them use a lot of memory.    Regular Weighing  Weighing yourself is an important and simple self-monitoring behavior to serve as reminder of one’s eating and physical activity habits. Although it may be hard and sometimes discouraging to weigh yourself while losing weight, it is recommended to weigh yourself weekly, preferably outside of the home on the same scale.    Using the scale at the local gym or  exercise facility or your doctor’s office may be more accurate than home scales. However, if this is unrealistic, it is okay to use a home scale. Try to weigh yourself at the same time of day and the same day of the week.    Writing down your weekly weights on a table, graph or calendar can help you keep track of your success or to help you get back on track more quickly. It is important to note that weighing yourself more frequently than weekly is not recommended, as day to day fluctuations are not indicators of actual weight. Regular monitoring of your weight is also essential to help you maintain your weight after losing weight.    Exercise Logs  Another self-monitoring technique, along the same lines as food logs and diaries, is keeping an exercise log or diary. The number of minutes engaged and type and level of exertion of physical activity should ideally be recorded.    An important and often forgotten aspect of exercise logs is the level of perceived exertion. Walking for 30 minutes, at an easy compared to a hard pace, will result in different levels of calories burned and cardiovascular impact.    Typically, an easy physical activity that does not increase heart rate much, or alter breathing would usually be the pace that you walk around work or go shopping. Moderate level of physical exertion is when you are getting a mildly increased heart and breathing rate. Heavy or hard level of physical exertion would be sweating, increased heart rate (target heart rate range) as well as increased breathing.    Remember, physical activity can be done at one time or intermittently throughout the day. Logging exercise can be a positive feedback or a reminder to incorporate more exercise or physical activity into your daily routine.    Initial activities may be walking, riding a stationary bike or swimming at a slow pace. Other types of exercise that can be fun are dancing, exercise videos or chair exercises. You should  try to aim for 30 minutes of exercise on most days of the week.    Many people try to start out with exercise on three or four days of the week. However, if you can get yourself exercising most to all days of the week, even if only for 10 or 15 minutes, it will become more of a routine for you.    Healthy Lifestyle Tip  All adults should set a long-term goal to accumulate at least 30 minutes or more of moderate-intensity physical activity on most to all days of the week. Also, try to increase activities of daily living such as taking the stairs instead of the elevator, parking further away or walking to a bathroom that is further from your desk. Reducing sedentary time is a good strategy to increase activity by undertaking frequent, less strenuous activities. With time, you may be able to engage in more strenuous activities.    Pedometers  Self-monitoring tools are becoming more and more popular and accurate. One of the simplest of these self-monitoring tools is a pedometer. Pedometers give objective data of physical activity throughout the day. Pedometers can be found in almost any [employer] or retail store.    Some of the more popular manufactures include Digi-Walker, Tickade, Tempo Payments, Lumaqco, [employer] Technologies, OberScharrer, Freestyle, Oktogo, Muzy and many others. Vocal[employer] and InstaMed make pedometer of speedometer devices that calculate steps and speed using GPS. These clip-on devices are inexpensive, ranging from less than $15 up to $75.    Many people get an average of 3,000 steps per day with daily activity. In order to burn off extra calories for weight-loss, walking 10,000 steps per day is recommended. For regular health, a minimum of 6,000 steps per day is required. Research suggests that a deliberate walk of 4,000-6,000 steps will help with weight-loss. It is often a good idea to keep track of your daily steps taken in your exercise log.    Pedometers can be frustrating for those who are more  interested in distance traveled. Focusing on the number of steps and ways to incorporate more steps throughout the day will make as much of a difference with weight-loss as actual distance does. Pedometers encourage people to find ways to add more steps throughout the day.    Because step counting is becoming more popular, advances are being made in the technology behind pedometers. New pedometers display steps and count them accurately. They are meant to be worn everyday and all day, as motivation to keep stepping, Most are small and comfortable to wear.    Pedometers sense your body motion, counting your footsteps usually by a turned pendulum technology, a coiled spring mechanism and a hairspring mechanism (which is the least accurate). The unit should be accurate in its count when you wear it correctly. You may need to experiment with where to wear it. You can measure your stride and then the pedometer can estimate distance traveled.    Some pedometers today offer multifunction options like calorie estimates, clocks, timers, stopwatches, speed estimators, seven-day memory or pulse rate readers, voice feedback and radios.    Accelerometers  Although pedometers are very cost-effective, one of the main flaws in using pedometers, however, is that they do not record intensity (how hard) or duration (how long) or frequency (how often) movement occurs. Accelerometers are devices that can objectively measure frequency, duration and intensity of physical activity.    Accelerometers provide a high level of accuracy when assessing physical activity. There are a variety of commercially available accelerometers, or activity monitors, which come in a wide range of prices anywhere from $50 to $1,000. BioTrainer and Nike are examples of affordable accelerometers.    Many of the more expensive accelerometers are used only in research or as a part of a hospital-based program. These monitors are more complex than pedometers in that  they display and store more complex data. Some are designed to download to a computer for analysis of intensity levels, movements and physical activity patterns. They can also be used to estimate calories burned or energy expenditure.    Accelerometers have sophisticated sensors that convert physical movement into an electrical signal that is relative to the muscular force needed to produce the work. Accelerometers can be found in uniaxial or triaxial measures. Uniaxial accelerometers measure in a single plane and can be attached to the trunk or limbs. Triaxial accelerometers measure along three planes: vertical, medial-lateral and anterior-posterior.    Although accelerometers are a step up from pedometers in accuracy of physical activity, they cannot register resistance. Therefore, if you are strength training or adding resistance to your bike or treadmill or adding an incline to your walking, it will not be able to discern the added level of energy required to do that work.    Metabolic Devices  One of the most accurate and most expensive tools for self-monitoring are tools that have very sophisticated monitoring and interpreting sensors for calories burned. Many of these devices have options to subscribe to a Web-based calorie counter system that integrates the amount of calories burned measured by the equipment and your calories consumed that you enter in easy to use food logs.    These devices are more accurate in measurements of calories expended because they use not only accelerometer technology, but also heat flux sensors, galvanic skin response (to measure physical exertion and emotional stimuli) and skin temperature gauges. Some also include heart rate monitoring techniques. All of these technologies combined lead to a very accurate measurement of calories expended throughout the day.    These devices can determine if you are sitting, sleeping, jogging, walking, lifting weights or riding in a car. Many of  these devices are very expensive and used primarily for research, however, some are available commercially.    This technology is also employed by hospital-based programs. Patients wear the hospital’s armband and track their nutrition on the Web site or computer-based program for typically one to two weeks. When they return to the clinic, the information will be uploaded and the practitioners will be able to work with the patients with objective data on metabolic lifestyle patterns.    Practitioners can also monitor patients on integrated software applications to provide consultations without being face to face. Practitioners have the ability to set daily goals to tailor programs to the individual patient. These are great tools to help objectively monitor behavior and physical activity, as well as providing real time feed back to the patient. [employer] is one company that offers this technology.    Conclusion  Although specific diseases and treatments vary, behavior modification is the major key in weight-loss or prevention and decreases the risk of diseases. Self-monitoring is a key to behavior modifications, and there are a multitude of ways to self-monitor. With technology advancements, self-monitoring techniques are changing and improving to help defeat some of the major barriers to compliance. The bottom line is that no matter how you do it, self-monitoring should be an important part of your weight-loss, weight maintenance or healthy lifestyle change. Then, the next step is to be sure the self-monitoring translates into positive behavior changes with regards to diet and exercise.    Set your food environment up for success with tips listed below to help support your health journey and provide less temptations!  How to Create a Healthy Food Environment in Your Home  December 28, 2020  Posted in Blog, Nutrition  By [employer] [facility] [facility]    Why create a healthy food environment in your home? Every day,  decisions we make about food are influenced by hundreds of factors, many of them subconscious. For example, an open box of cookies on the counter is more tempting than an apple in your crisper.  Having a healthy food environment at home will make it easier to choose healthy options and stay on track with habits that support your health goals.  Here are a few steps you can take to set up your home for success.  How to Build a Healthy Food Environment  1 - Clear Your Home of Less-healthy Food  These foods are okay in moderation, but they can be enjoyed outside of the house in a fun and empowering way. Go through your kitchen fridge, freezer, pantry, cabinets and counters to toss out foods that aren't helping you meet your health goals.  2 - Put Healthy Foods in Plain Sight  Keep grab-n-go fruit on the counter instead of snack foods so they are easily accessible. Consider moving your refrigerated fruit to a shelf at eye-level instead of keeping it in the crisper. Vice versa, move less healthy foods to the crisper so they aren't always in your line of sight.  3 - Find Healthy Substitutions  Feeling deprived of your favorite foods can make you miserable and resentful. Instead, experiment with healthier alternatives. Here are some examples:  Calorie-controlled frozen yogurt bars like Yasso vs high-calorie ice cream novelties  Sparkling water like Bubbly vs soda or fruit juice  Skinny popcorn vs chips and crackers  Dessert hummus vs pudding cups or ice cream  4 - Clean and Organize  A messy kitchen can make it hard to find healthy choices when you are hungry or short on time. It also makes it hard to locate whether or not you need something from the grocery store. Give your kitchen a new year makeover and organize your food storage areas so you can always locate what you need without distraction.  5 - Eat at Your Kitchen Table  Eating at your kitchen table without distractions is a way to eat more mindfully and enjoy time  with your family. Eating on the couch while watching shows or movies can encourage mindless eating and consuming more calories than intended.  6 - Keep Healthy Foods Readily Available  When you are hungry and in a pinch, it's important to have healthy foods on-hand so you don't make hunger-controlled choices. This means keeping ready-to-go-snack foods in your kitchen such as fruit paired with nut butter, hummus and carrots, lunch meat and cheese roll-ups, etc. It may also help to keep pre-made freezer meals on-hand (something you've cooked in advance) for busy weeknights when you don't have time to prepare food.  Turning your home into a healthy food environment can set you up for success and confident decision-making. Plus, it feels good to be organized and empowered!  Why We Gain Weight When We’re Stressed--And How Not To  The psychology and biology of stress-related overeating and weight gain   Emotional Eating under Stress  Have you ever found yourself mindlessly eating a tub of ice cream while you brood about your latest romantic rejection or eating a hamburger and fries in front of your computer as you furiously try to make a work deadline? Perhaps you’re a busy mom, eating cookies in your car as you shuttle the kids back and forth to a slew of activities. Or you’re a small business owner desperately trying to make ends meet when you suddenly realize your waistline has expanded. If you recognize yourself in any of these scenarios, you’re not alone and it’s probably not your fault. Stress that goes on for a long period is a triple whammy for weight--it increases our appetites, makes us hold onto the fat, and interferes with our willpower to implement a healthy lifestyle.  Below are the four major reasons stress leads to weight gain and four great research-based coping strategies you can use to fight back.  Hormones  When your brain detects the presence of a threat, no matter if it is a snake in the grass, a  grumpy boss, or a big credit card bill, it triggers the release of a cascade of chemicals, including adrenaline, CRH, and cortisol. Your brain and body prepare to handle the threat by making you feel alert, ready for action and able to withstand an injury. In the short-term, adrenaline helps you feel less hungry as your blood flows away from the internal organs and to your large muscles to prepare for “fight or flight.” However, once the effects of adrenaline wear off, cortisol, known as the “stress hormone,” hangs around and starts signaling the body to replenish your food supply. Fighting off wild animals, like our ancestors did, used up a lot of energy, so their bodies needed more stores of fat and glucose. Today’s human, who sits on the couch worrying about how to pay the bill or works long hours at the computer to make the deadline, does not work off much energy at all dealing with the stressor! Unfortunately, we are stuck with a neuroendocrine system that didn’t get the update, so your brain is still going to tell you to reach for that plate of cookies anyway.  Belly Fat  In the days when our ancestors were fighting off tigers and famine, their bodies adapted by learning to store fat supplies for the long haul. The unfortunate result for you and me is that when we are chronically stressed by life crises and work-life demands, we are prone to getting an extra layer of “visceral fat” deep in our bellies. Your belly has an ample supply of blood vessels and cortisol receptors to make the whole process flow more efficiently. The downside is that excess belly fat is unhealthy and difficult to get rid of. The fat releases chemicals triggering inflammation, which increases the likelihood that we will develop heart disease or diabetes. And it can make it more difficult to fit into those massimo jeans you splurged on, leading to more stress about money wasted! Unfortunately, excess cortisol also slows down your  metabolism, because your body wants to maintain an adequate supply of glucose for all that hard mental and physical work dealing with the threat.  Anxiety  When we have a surge of adrenaline as part of our fight/flight response, we get fidgety and activated. Adrenaline is the reason for the “wired up” feeling we get when we’re stressed. While we may burn off some extra calories fidgeting or running around cleaning because we can’t sit still, anxiety can also trigger “emotional eating.” Overeating or eating unhealthy foods in response to stress or as a way to calm down is a very common response. In the most recent American Psychological Association’s “Stress in [place]:” survey, a whopping 40% of respondents reported dealing with stress in this way, while 42% reported watching television for more than 2 hours a day to deal with stress. Being a couch potato also increases the temptation to overeat and is inactive, which means that those extra calories aren’t getting burned off. Anxiety can also make you eat more “mindlessly” as you churn around worrying thoughts in your head, not even focusing on the taste of the food, how much you’ve eaten, or when you are feeling full. When you eat mindlessly, you will likely eat more, yet feel less satisfied.  Cravings and Fast Food  When we are chronically stressed, we crave “comfort foods,” such as a bag of potato chips or a tub of ice cream. These foods tend to be easy to eat, highly processed, and high in fat, sugar, or salt. We crave these foods for both biological and psychological reasons. Stress may mess up our brain’s reward system or cortisol may cause us to crave more fat and sugar. We also may have memories from childhood, such as the smell of freshly baked cookies,, that lead us to associate sweet foods with comfort. When we are stressed, we also may be more likely to drive through the Fast Food place, rather than taking the time and mental energy to plan and cook a  meal. Americans are less likely to cook and eat dinner at home than people from many other countries, and they also work more hours. Working in urban areas may mean long, jammed commutes, which both increase stress and interfere with willpower because we are hungrier when we get home later. A [facility] research study showed, in laboratory mice, that being “stressed” by exposure to the smell of a predator lead the mice to eat more high-fat food pellets, when given the choice of eating these instead of normal feed.  Less Sleep  Do you ever lie awake at night worrying about paying the bills or about who will watch your kids when you have to go to work? According to the APA’s “Stress in [place]” survey, more than 40% of us lie awake at night as a result of stress. Research shows that worry is a major cause of insomnia. Our minds are overactive and won’t switch off. We may also lose sleep because of pulling overnights to cram for exams or writing until the early hours. Stress causes decreased blood sugar, which leads to fatigue. If you drink coffee or caffeinated soft drinks to stay awake, or alcohol to feel better, your sleep cycle will be even more disrupted. Sleep is also a powerful factor influencing weight gain or loss. Lack of sleep may disrupt the functioning of ghrelin and leptin--chemicals that control appetite. We also crave carbs when we are tired or grumpy from lack of sleep. Finally, not getting our gerda zzzz’s erodes our willpower and ability to resist temptation. In one study, overweight/obese dieters were asked to follow a fixed calorie diet and assigned to get either 5 and a half or eight and a half hours of sleep a night (in a sleep lab). Those with sleep deprivation lost substantially less weight.  How to Minimize Weight Gain When You’re Stressed  Exercise  Aerobic exercise has a one-two punch. It can decrease cortisol and trigger release of chemicals that relieve pain and improve  mood. It can also help speed your metabolism so you burn off the extra indulgences  Eat Mindfully  Mindful Eating programs train you in meditation, which helps you cope with stress, and change your consciousness around eating. You learn to slow down and tune in to your sensory experience of the food, including its sight, texture or smell. You also learn to tune into your subjective feelings of hunger or fullness, rather than eating just because it’s a mealtime or because there is food in front of you. A well-designed study of binge-eaters showed that participating in a Mindful Eating program led to fewer binges and reduced depression.  Find Rewarding Activities Unrelated to Food  Taking a hike, reading a book, going to a yoga class, getting a massage, patting your dog, or making time for friends and family can help to relieve stress without adding on the pounds. Although you may feel that you don’t have time for leisure activities with looming deadlines, taking time to relieve stress helps you to feel refreshed, lets you think more clearly, and improves your mood, so you are less likely to overeat.  Write in a Journal  Writing down your experiences and reactions or your most important goals keeps your hands busy and your mind occupied, so you’re less likely to snack on unhealthy foods. Writing can give you insight into why you’re feeling so stressed and highlight ways of thinking or expectations of yourself that may be increasing the pressure you feel. Writing down your healthy eating and exercise goals may make you more conscious of your desire to live a healthier lifestyle and intensify your commitment. Research studies have also shown that writing expressively or about life goals can improve both mood and health.    Source: Psychology Today  Date: Posted Aug 28, 2013   Author: Paris Silveira, Ph.D

## 2025-01-18 VITALS
BODY MASS INDEX: 40 KG/M2 | WEIGHT: 315 LBS | DIASTOLIC BLOOD PRESSURE: 88 MMHG | SYSTOLIC BLOOD PRESSURE: 140 MMHG | HEIGHT: 74.5 IN | HEART RATE: 80 BPM | RESPIRATION RATE: 18 BRPM

## 2025-01-18 PROBLEM — R63.8 CRAVING FOR PARTICULAR FOOD: Status: ACTIVE | Noted: 2025-01-18

## 2025-02-17 ENCOUNTER — TELEPHONE (OUTPATIENT)
Dept: INTERNAL MEDICINE CLINIC | Facility: CLINIC | Age: 35
End: 2025-02-17

## 2025-05-05 NOTE — PROGRESS NOTES
[facility] Weight Management follow up via video visit:    Subjective    This visit is conducted using Telemedicine with live, interactive video and audio.    Chief Complaint:  Routine follow up visit for lifestyle and medical management for overweight, obesity, or morbid obesity. LOV in clinic weight: 421#.    PMH reviewed. Bariatric surgery planned or hx of surgery in the past: 5/10 interest.    HPI:   Lakhwinder Ma is a 34 year old male who is being followed up today for lifestyle and medical management as deemed appropriate for overweight, obesity or morbid obesity. Patient reports weight loss monitoring at which he reports 6# weight loss. This appears to be a weight loss since LOV 4 months ago in clinic. Patient has been consistent with medication but only taking Topamax daily vs BID as prescribed. No coverage for Zepbound for dx of DAISHA.    Questions/Concerns/Comments since LOV: Never had f/u with counseling, but ready to engage now. Continues with psych NP for medication management for mental health. Reports some appetite and craving reduction with consistent use of medication.    Lifestyle/Social Hx Reviewed:    [facility] Medical Weight Loss Follow Up    Question 5/6/2025  9:00 AM CDT - Filed by Patient   Please describe a success moment: Have been successfully taking my medication every day for over two months now   Please describe a challenging moment/needs for improvement: Need to continue decreasing what I eat and trying to eat out less   Please complete this 24 hour food journal, listing everything you had to eat in the past day. Include the average time of day you ate these meals at    List foods, qty and prep for breakfast:    List foods, qty and prep for lunch.    List foods, qty and prep for dinner.    List foods, qty and prep for snacks.    List the types and qty of fluids consumed    On average, how many meals did you eat out per week?    Exercise    How many days per week are you active or  exercise    On average, how many days were anaerobic (strength/resistance) exercises performed?    On average, how many days were aerobic (cardio) exercises performed?    Perceived level of exertion on a scale of 1-5, with 5 being very intense:    Stress    Average stress level on a scale of 1-10, with 10 being extremely stressed: 5   If greater than 5/1O how would you grade your coping mechanisms? moderate   Sleep hours and integrity    How many hours of uninterrupted sleep do you get a night: 8   Do you feel rested in the morning: Yes   If no, what may have been disrupting your sleep?    Please list any goal(s) for your next visit Get referral to dietary psychiatrist       ROS  General: feeling well, denies fatigue  EYES: denies vision changes or high pain/pressure.  CV: denies cp, palpitations  Resp: denies sob  GI: denies abdominal pain. Denies N/V/D/C.  Neuro: denies paresthesia or cognitive changes  Psych: denies any mood changes    Physical Exam:  >   BP Readings from Last 3 Encounters:   01/16/25 140/88   11/26/24 140/90   11/08/24 126/86       Home weight: see above  Home BP: not available  Home blood sugars: n/a    General: patient speaking in full sentences, no increased work of breathing. alert, appears stated age, cooperative, no distress, and morbidly obese  HENT: normocephalic  Resp: Breathing is non labored  Psych: patient appears cheerful, smiling, making good eye contact    Diagnoses and all orders for this visit:    DAISHA on CPAP  -     OP REFERRAL TO [facility]  -     OP REFERRAL TO [facility] ([facility] USE ONLY)    Encounter for therapeutic drug monitoring  -     naltrexone 50 MG Oral Tab; Take 1 tablet (50 mg total) by mouth daily with food. For cravings  -     topiramate 50 MG Oral Tab; Take 1 tablet (50 mg total) by mouth 2 (two) times daily.    Class 3 severe obesity with serious comorbidity and body mass index (BMI) of 50.0 to 59.9 in adult, unspecified obesity type  Comments:  Baseline BMI:  55.44 (11/26/24)  Orders:  -     [facility]  -     OP REFERRAL TO [facility]  -     naltrexone 50 MG Oral Tab; Take 1 tablet (50 mg total) by mouth daily with food. For cravings  -     topiramate 50 MG Oral Tab; Take 1 tablet (50 mg total) by mouth 2 (two) times daily.    Primary hypertension  -     OP REFERRAL TO [facility]  -     OP REFERRAL TO [facility] ([facility] USE ONLY)    Anxiety and depression  -     OP REFERRAL TO [facility]  -     OP REFERRAL TO [facility] ([facility] USE ONLY)    Binge eating  -     [facility]  -     OP REFERRAL TO [facility] ([facility] ONLY)    Craving for particular food  -     [facility]  -     OP REFERRAL TO [facility] ([facility] USE ONLY)  -     naltrexone 50 MG Oral Tab; Take 1 tablet (50 mg total) by mouth daily with food. For cravings  -     topiramate 50 MG Oral Tab; Take 1 tablet (50 mg total) by mouth 2 (two) times daily.        Patient Instructions   Continue making lifestyle changes that focus on good nutrition, regular exercise and stress management.    Medication Plan: Continue Naltrexone at current dose. Increase Topamax to 25 mg twice a day (taking PM dose around 4pm to reduce hunger and cravings in the evenings) x7 days and then increase to 50 mg twice a day. Reviewed out of pocket cash pay option for Zepbound at $499/month if interested in the future.    Discuss with psychiatry treatment of binge eating disorder with Vyvanse if appropriate. This would need to be prescribed by your NP psychiatrist.    Next steps to work on before next office visit include: Great job in being consistent with medication! Consider bariatric surgery as a tool to support weight loss with information listed below for support. Please schedule consult with Kristal, our dietician. Referral placed for counseling.    Consider attending the bariatric seminar, via virtual presentation hosted 2 Monday evenings out of the month, to learn  more about surgical options for weight loss.    Call [phone] or in your Internet search browser type in:    [email or web address]    Then click on Attend a FREE bariatric seminar      Deciding on Bariatric Surgery    Is excess weight affecting your life and your health? Bariatric surgery (also called obesity surgery) may help you reach a healthier weight. This surgery alters your digestive system. For the surgery to work, you must change your diet and lifestyle. In most cases, the surgery is not reversible. So if you’re considering surgery, learn all you can about it before you decide. Bariatric surgery also has a number of potential risks and complications that you need to discuss with your surgeon.  Qualifying for surgery  Surgery is not for everyone. To qualify:  You must have a BMI of 40 or more, or a BMI of 35 or more (see box below) plus a serious obesity-related health problem, such as type 2 diabetes, high blood pressure, or sleep apnea.  You must be healthy enough to have surgery.  You may be required to have a psychological evaluation.  You must have tried to lose weight by other means, such as dieting.  Setting realistic expectations  The goal of bariatric surgery is to help you lose over half of your excess weight. This can improve or prevent health problems. This surgery is not done for cosmetic reasons. Keep in mind that:  Other weight-loss methods should be tried first. Lifestyle changes, behavioral modifications, and prescription medicines are initial choices. Surgery is only a choice if other methods don’t work.  Surgery is meant to be permanent. You will need to change how you eat for the rest of your life.  You must commit to eating less and being more active after surgery. If you don’t, you will not lose or keep off the weight.  You won’t reach a healthy weight right away. Most weight is lost steadily during the first  year or two after surgery.  Most likely, you won’t lose all your excess weight. But you can reach a much healthier weight.  Obesity is measured by a formula called body mass index (BMI), which is based on your height and weight. A healthy BMI is about 18 to 25. A BMI of 30 or more signals obesity. A BMI of 40 or more reflects severe (morbid) obesity.   Resources  American Society for Metabolic and Bariatric Surgerywww.asmbs.org  National Heart, Lung, and Blood [place] Obesity Education Initiativewww.nhlbi.nih.gov/health/public/heart/obesity/lose_wt  Date Last Reviewed: 2/17/2016  © 7983-7449 [employer]. [address] [place] [address], [place] [address]. All rights reserved. This information is not intended as a substitute for professional medical care. Always follow your healthcare professional's instructions.      Realistic Advice for Life after Bariatric Surgery    by Marti Huddleston RN, CBN, THAI    OAC at www.obesityaction.org Summer 2018 Resource    Can we talk? I mean seriously talk about realistic expectations and your responsibility as a bariatric surgery patient? Okay, good.    Now, go grab your favorite beverage of choice: iced tea (unsweet of course ), a cup of coffee (sure, if it’s decaf ), or everyone’s plain favorite… water (insert eye roll ). Find your favorite reading  spot and let’s have a chat.    As a bariatric surgery patient, you’ve likely heard the following phrase: “Surgery is a tool.” It’s also very likely that, much like me, you have a love/hate relationship with this phrase. 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 Instead, my dear reader, you have been given the surgically-altered anatomic tool of reduced stomach capacity. This  allows you to:    Eat a smaller amount of food  Feel more full with a smaller amount of food  Reduced stomach capacity through bariatric surgery addresses only one of many factors that come together to affect weight.    A Quick Look at the Facts  As a gastric bypass patient who is 17 years post-surgery, this is what I know from experience:    The “tool” still works when I work it. It’s not as new and shiny as it once was. The manual is a little worn and frayed. It needs to be given proper care to stay in top form.  No one - not my surgeon, dietitian nor my primary care provider - is with me 24 hours a day, seven days a week, 365 days a year.  I alone am responsible for the choices I make every day. This extends far beyond my simple food choices.  Health is a relative term. It ebbs and flows along a continuum (we’ll chat more about this concept later on).  I want you to have some realistic expectations for your life after bariatric surgery. I’m not talking about the first few months or even at the one-year tonio. I’m talking about two years, 10 years and even 20 years after you’ve had surgery.    Examining Common Expectations  Let’s begin with weight-loss expectations. I frequently hear patients share concerns about not having “lost enough.” The question I throw back to you is, “What is enough?”    While you were likely given a goal weight before surgery - a number on the scale - you have to realize that number is based on height and weight tables which are decades old. It’s a number that very few bariatric surgery patients will actually reach. So I ask again, “What is enough?”    If your goals are centered on improving or resolving health conditions such as diabetes and high blood pressure, you can focus on real, broader aspirations that don’t concentrate on a number on a scale. That number is a guide, but not the whole story of your weight and health. A better approach may be to set goals to become more mobile, play  on the floor with your children or grandchildren or simply get through the day feeling better physically and mentally. Keep these goals front and center as a reminder of why you make the choices you do every single day.    Realistic Goals: Yours and Mine  These are some of my own personal (and I feel realistic) daily goals:    To move just as much - or more - than I did the day before  To drink more water than coffee  To give support whenever I seek support  To encourage others whenever I feel discouraged  Notice that not a single one of those goals included a number on the scale. Now, take a few moments and write-out your own goals. Your goals should be SMART. In other words, they need to be specific, measurable, achievable, relevant and timely. Mine meet all of these requirements. They are not fancy, but they’re realistic for me. Yours should be realistic for you.    Now that you’ve set some realistic goals, let’s get real about things you can do every day to make these goals become reality.    Take Your Vitamins. These are real, grown-up, bariatric vitamins. Yes, back in 2001 when I had surgery, I too was told to just take two Flintstones Complete chewables. Guess what? Times have changed. Studies have been done. Multiple societies (American Association of Clinical Endocrinologists, The Obesity Society, American Society for Metabolic and Bariatric Surgery1, 2) have made recommendations regarding the need for long-term vitamin and mineral supplementation after bariatric surgery. Please don’t mess around with this one.    Have Your Labs Drawn at Least Once a Year. These include bariatric labs which are reviewed by someone who knows what the results really mean. Ideally, this should be done by your bariatric surgeon or a bariatrician. At a minimum, the following labs should be checked annually: CMP, Hematocrit, Ferritin, B12, Folic Acid, PTH and Vitamin D 25-Hydroxy Intact.    Drink Tons of Fluids Every Day. Yes,  tons - to the point where your urine is barely yellow. This not only prevents dehydration, but keeps you feeling full, flushes out toxins, prevents urinary and bladder infections and makes nearly everything in your body work better. No, they don’t have to be decaffeinated - unless your healthcare provider says so. And no, they don’t have to be non-carbonated - unless your healthcare provider says so.    Avoid Liquid Calories. I love Starbucks and I know many of you do, too. Can you have a venti white mocha every day and expect not to struggle with weight maintenance? Probably not. Can you have an occasional short or tall latte? Sure. The same scenario goes for other liquid calories such as milk, juice, etc.    Continue to Separate Eating from Drinking. Once you’re a year or more out from surgery, you can drink pretty much up to the time of eating. However, drinking with your meals at any point after surgery will flush food through your pouch quicker. This can affect calorie intake by allowing you to eat more and to not feel full for as long after a meal.  Wait at least 30 minutes  (ideally an hour) after eating to resume drinking so your feeling of fullness stays with you for as long as possible.    Track Your Behavior. You can’t see where you’re going if you don’t know where you’ve been. Please keep some type of food journal. Use an migue, a sticky note or even your phone camera. Even if you only do it a few days every month, it really does help! I promise.    Stop Beating Yourself Up. We only get one life. It’s time to start living it to the fullest every day. Whatever you did or didn’t do yesterday or this morning, it’s over, gone and finished. What matters is this moment right now. Make the best decision for you in this moment and then make another best decision for you in the next moment. Then keep repeating.    Stop Trying to Meet Someone Else’s Ideal. Wear your Spanx if it makes you feel better, or don’t. Put  on makeup if it makes you feel more fierce, or don’t. Be selfish. Take 10-15 minutes for yourself every day. You deserve that and it’s not really being selfish.    Figure Your Mental and Emotional Stuff Out. Will all of the emotional reasons we eat ever be fully resolved? Probably not. Will continuing to stay involved with support groups (true support groups, where you actually find the help you’re seeking) and working with a therapist or counselor help you find alternatives to eating your emotions? Probably so.    Conclusion  Please take note: we’re nearly to the end of our chat and I’ve not given you a list of foods to avoid, a number of calories to eat or a specific meal plan to follow. There are a couple of reasons why. First, if you work on setting those realistic goals that are specific to you and focus on numbers one through nine on the previous page, the food situation tends to take care of itself. However, I still believe in (and more often than not, follow) the recommendation to eat protein first and for half of every meal, and to keep my carb count low.    This is my last suggestion: don’t forget who you are and where you started from. Be a source of support and strength for others. Enjoy your journey because the destination will always be just out of reach. That’s what keeps us moving forward.      Return in about 2 months (around 7/6/2025) for as scheduled.    DOCUMENTATION OF TIME SPENT: Code selection for this visit was based on time spent : 25 minutes on date of service in preparing to see the patient, obtaining and/or reviewing separately obtained history, performing a medically appropriate examination, counseling and educating the patient/family/caregiver, ordering medications or testing, referring and communicating with other healthcare providers, documenting clinical information in the electronic medical record, independently interpreting results and communicating results to the  patient/family/caregiver and care coordination with the patient's other providers.    Answers submitted by the patient for this visit:  Medical Weight Loss Follow Up (Submitted on 5/6/2025)  If greater than 5/1O how would you grade your coping mechanisms?: moderate

## 2025-05-06 ENCOUNTER — TELEMEDICINE (OUTPATIENT)
Dept: INTERNAL MEDICINE CLINIC | Facility: CLINIC | Age: 35
End: 2025-05-06
Payer: COMMERCIAL

## 2025-05-06 DIAGNOSIS — R63.2 BINGE EATING: ICD-10-CM

## 2025-05-06 DIAGNOSIS — I10 PRIMARY HYPERTENSION: ICD-10-CM

## 2025-05-06 DIAGNOSIS — Z51.81 ENCOUNTER FOR THERAPEUTIC DRUG MONITORING: ICD-10-CM

## 2025-05-06 DIAGNOSIS — F32.A ANXIETY AND DEPRESSION: ICD-10-CM

## 2025-05-06 DIAGNOSIS — R63.8 CRAVING FOR PARTICULAR FOOD: ICD-10-CM

## 2025-05-06 DIAGNOSIS — F41.9 ANXIETY AND DEPRESSION: ICD-10-CM

## 2025-05-06 DIAGNOSIS — G47.33 OSA ON CPAP: Primary | ICD-10-CM

## 2025-05-06 DIAGNOSIS — E66.813 CLASS 3 SEVERE OBESITY WITH SERIOUS COMORBIDITY AND BODY MASS INDEX (BMI) OF 50.0 TO 59.9 IN ADULT, UNSPECIFIED OBESITY TYPE: ICD-10-CM

## 2025-05-06 PROBLEM — E66.9 OBESITY, UNSPECIFIED: Status: ACTIVE | Noted: 2023-08-21

## 2025-05-06 PROCEDURE — 98005 SYNCH AUDIO-VIDEO EST LOW 20: CPT | Performed by: NURSE PRACTITIONER

## 2025-05-06 RX ORDER — TOPIRAMATE 50 MG/1
50 TABLET, FILM COATED ORAL 2 TIMES DAILY
Qty: 60 TABLET | Refills: 2 | Status: SHIPPED | OUTPATIENT
Start: 2025-05-06

## 2025-05-06 RX ORDER — NALTREXONE HYDROCHLORIDE 50 MG/1
50 TABLET, FILM COATED ORAL
Qty: 90 TABLET | Refills: 1 | Status: SHIPPED | OUTPATIENT
Start: 2025-05-06

## 2025-05-06 NOTE — PATIENT INSTRUCTIONS
Continue making lifestyle changes that focus on good nutrition, regular exercise and stress management.    Medication Plan: Continue Naltrexone at current dose. Increase Topamax to 25 mg twice a day (taking PM dose around 4pm to reduce hunger and cravings in the evenings) x7 days and then increase to 50 mg twice a day. Reviewed out of pocket cash pay option for Zepbound at $499/month if interested in the future.    Discuss with psychiatry treatment of binge eating disorder with Vyvanse if appropriate. This would need to be prescribed by your NP psychiatrist.    Next steps to work on before next office visit include: Great job in being consistent with medication! Consider bariatric surgery as a tool to support weight loss with information listed below for support. Please schedule consult with Kristal, our dietician. Referral placed for counseling.    Consider attending the bariatric seminar, via virtual presentation hosted 2 Monday evenings out of the month, to learn more about surgical options for weight loss.    Call [phone] or in your Internet search browser type in:    [email or web address]    Then click on Attend a FREE bariatric seminar      Deciding on Bariatric Surgery    Is excess weight affecting your life and your health? Bariatric surgery (also called obesity surgery) may help you reach a healthier weight. This surgery alters your digestive system. For the surgery to work, you must change your diet and lifestyle. In most cases, the surgery is not reversible. So if you’re considering surgery, learn all you can about it before you decide. Bariatric surgery also has a number of potential risks and complications that you need to discuss with your surgeon.  Qualifying for surgery  Surgery is not for everyone. To qualify:  You must have a BMI of 40 or more, or a BMI of 35 or more (see box below) plus a serious obesity-related health  problem, such as type 2 diabetes, high blood pressure, or sleep apnea.  You must be healthy enough to have surgery.  You may be required to have a psychological evaluation.  You must have tried to lose weight by other means, such as dieting.  Setting realistic expectations  The goal of bariatric surgery is to help you lose over half of your excess weight. This can improve or prevent health problems. This surgery is not done for cosmetic reasons. Keep in mind that:  Other weight-loss methods should be tried first. Lifestyle changes, behavioral modifications, and prescription medicines are initial choices. Surgery is only a choice if other methods don’t work.  Surgery is meant to be permanent. You will need to change how you eat for the rest of your life.  You must commit to eating less and being more active after surgery. If you don’t, you will not lose or keep off the weight.  You won’t reach a healthy weight right away. Most weight is lost steadily during the first year or two after surgery.  Most likely, you won’t lose all your excess weight. But you can reach a much healthier weight.  Obesity is measured by a formula called body mass index (BMI), which is based on your height and weight. A healthy BMI is about 18 to 25. A BMI of 30 or more signals obesity. A BMI of 40 or more reflects severe (morbid) obesity.   Resources  American Society for Metabolic and Bariatric Surgerywww.asmbs.org  National Heart, Lung, and Blood [place] Obesity Education Initiativewww.nhlbi.nih.gov/health/public/heart/obesity/lose_wt  Date Last Reviewed: 2/17/2016  © 3576-7533 [employer]. [address] [place] [address]. All rights reserved. This information is not intended as a substitute for professional medical care. Always follow your healthcare professional's instructions.      Realistic Advice for Life after Bariatric Surgery    by Marti Huddleston RN, CBN, THAI    OAC at www.obesityaction.org Summer 2018  Resource    Can we talk? I mean seriously talk about realistic expectations and your responsibility as a bariatric surgery patient? Okay, good.    Now, go grab your favorite beverage of choice: iced tea (unsweet of course ), a cup of coffee (sure, if it’s decaf ), or everyone’s plain favorite… water (insert eye roll ). Find your favorite reading  spot and let’s have a chat.    As a bariatric surgery patient, you’ve likely heard the following phrase: “Surgery is a tool.” It’s also very likely that, much like me, you have a love/hate relationship with this phrase. 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 Instead, my dear reader, you have been given the surgically-altered anatomic tool of reduced stomach capacity. This allows you to:    Eat a smaller amount of food  Feel more full with a smaller amount of food  Reduced stomach capacity through bariatric surgery addresses only one of many factors that come together to affect weight.    A Quick Look at the Facts  As a gastric bypass patient who is 17 years post-surgery, this is what I know from experience:    The “tool” still works when I work it. It’s not as new and shiny as it once was. The manual is a little worn and frayed. It needs to be given proper care to stay in top form.  No one - not my surgeon, dietitian nor my primary care provider - is with me 24 hours a day, seven days a week, 365 days a year.  I alone am responsible for the choices I make every day. This extends far beyond my simple food choices.  Health is a relative term. It ebbs and flows along a continuum (we’ll chat more about this concept later on).  I want you to have some realistic expectations for your life after bariatric surgery. I’m not  talking about the first few months or even at the one-year tonio. I’m talking about two years, 10 years and even 20 years after you’ve had surgery.    Examining Common Expectations  Let’s begin with weight-loss expectations. I frequently hear patients share concerns about not having “lost enough.” The question I throw back to you is, “What is enough?”    While you were likely given a goal weight before surgery - a number on the scale - you have to realize that number is based on height and weight tables which are decades old. It’s a number that very few bariatric surgery patients will actually reach. So I ask again, “What is enough?”    If your goals are centered on improving or resolving health conditions such as diabetes and high blood pressure, you can focus on real, broader aspirations that don’t concentrate on a number on a scale. That number is a guide, but not the whole story of your weight and health. A better approach may be to set goals to become more mobile, play on the floor with your children or grandchildren or simply get through the day feeling better physically and mentally. Keep these goals front and center as a reminder of why you make the choices you do every single day.    Realistic Goals: Yours and Mine  These are some of my own personal (and I feel realistic) daily goals:    To move just as much - or more - than I did the day before  To drink more water than coffee  To give support whenever I seek support  To encourage others whenever I feel discouraged  Notice that not a single one of those goals included a number on the scale. Now, take a few moments and write-out your own goals. Your goals should be SMART. In other words, they need to be specific, measurable, achievable, relevant and timely. Mine meet all of these requirements. They are not fancy, but they’re realistic for me. Yours should be realistic for you.    Now that you’ve set some realistic goals, let’s get real about things you can  do every day to make these goals become reality.    Take Your Vitamins. These are real, grown-up, bariatric vitamins. Yes, back in 2001 when I had surgery, I too was told to just take two Flintstones Complete chewables. Guess what? Times have changed. Studies have been done. Multiple societies (American Association of Clinical Endocrinologists, The Obesity Society, American Society for Metabolic and Bariatric Surgery1, 2) have made recommendations regarding the need for long-term vitamin and mineral supplementation after bariatric surgery. Please don’t mess around with this one.    Have Your Labs Drawn at Least Once a Year. These include bariatric labs which are reviewed by someone who knows what the results really mean. Ideally, this should be done by your bariatric surgeon or a bariatrician. At a minimum, the following labs should be checked annually: CMP, Hematocrit, Ferritin, B12, Folic Acid, PTH and Vitamin D 25-Hydroxy Intact.    Drink Tons of Fluids Every Day. Yes, tons - to the point where your urine is barely yellow. This not only prevents dehydration, but keeps you feeling full, flushes out toxins, prevents urinary and bladder infections and makes nearly everything in your body work better. No, they don’t have to be decaffeinated - unless your healthcare provider says so. And no, they don’t have to be non-carbonated - unless your healthcare provider says so.    Avoid Liquid Calories. I love [employer] and I know many of you do, too. Can you have a venti white mocha every day and expect not to struggle with weight maintenance? Probably not. Can you have an occasional short or tall latte? Sure. The same scenario goes for other liquid calories such as milk, juice, etc.    Continue to Separate Eating from Drinking. Once you’re a year or more out from surgery, you can drink pretty much up to the time of eating. However, drinking with your meals at any point after surgery will flush food through your pouch  quicker. This can affect calorie intake by allowing you to eat more and to not feel full for as long after a meal.  Wait at least 30 minutes  (ideally an hour) after eating to resume drinking so your feeling of fullness stays with you for as long as possible.    Track Your Behavior. You can’t see where you’re going if you don’t know where you’ve been. Please keep some type of food journal. Use an migue, a sticky note or even your phone camera. Even if you only do it a few days every month, it really does help! I promise.    Stop Beating Yourself Up. We only get one life. It’s time to start living it to the fullest every day. Whatever you did or didn’t do yesterday or this morning, it’s over, gone and finished. What matters is this moment right now. Make the best decision for you in this moment and then make another best decision for you in the next moment. Then keep repeating.    Stop Trying to Meet Someone Else’s Ideal. Wear your Spanx if it makes you feel better, or don’t. Put on makeup if it makes you feel more fierce, or don’t. Be selfish. Take 10-15 minutes for yourself every day. You deserve that and it’s not really being selfish.    Figure Your Mental and Emotional Stuff Out. Will all of the emotional reasons we eat ever be fully resolved? Probably not. Will continuing to stay involved with support groups (true support groups, where you actually find the help you’re seeking) and working with a therapist or counselor help you find alternatives to eating your emotions? Probably so.    Conclusion  Please take note: we’re nearly to the end of our chat and I’ve not given you a list of foods to avoid, a number of calories to eat or a specific meal plan to follow. There are a couple of reasons why. First, if you work on setting those realistic goals that are specific to you and focus on numbers one through nine on the previous page, the food situation tends to take care of itself. However, I still believe in (and more  often than not, follow) the recommendation to eat protein first and for half of every meal, and to keep my carb count low.    This is my last suggestion: don’t forget who you are and where you started from. Be a source of support and strength for others. Enjoy your journey because the destination will always be just out of reach. That’s what keeps us moving forward.

## 2025-05-09 ENCOUNTER — LAB ENCOUNTER (OUTPATIENT)
Dept: LAB | Age: 35
End: 2025-05-09
Attending: FAMILY MEDICINE
Payer: COMMERCIAL

## 2025-05-09 ENCOUNTER — OFFICE VISIT (OUTPATIENT)
Dept: FAMILY MEDICINE CLINIC | Facility: CLINIC | Age: 35
End: 2025-05-09
Payer: COMMERCIAL

## 2025-05-09 VITALS
RESPIRATION RATE: 20 BRPM | HEART RATE: 94 BPM | SYSTOLIC BLOOD PRESSURE: 132 MMHG | WEIGHT: 315 LBS | DIASTOLIC BLOOD PRESSURE: 88 MMHG | OXYGEN SATURATION: 95 % | BODY MASS INDEX: 40 KG/M2 | HEIGHT: 74.5 IN

## 2025-05-09 DIAGNOSIS — Z00.00 WELLNESS EXAMINATION: Primary | ICD-10-CM

## 2025-05-09 DIAGNOSIS — Z00.00 LABORATORY EXAMINATION ORDERED AS PART OF A ROUTINE GENERAL MEDICAL EXAMINATION: ICD-10-CM

## 2025-05-09 DIAGNOSIS — F32.1 CURRENT MODERATE EPISODE OF MAJOR DEPRESSIVE DISORDER WITHOUT PRIOR EPISODE (HCC): ICD-10-CM

## 2025-05-09 DIAGNOSIS — R25.2 CRAMPS, EXTREMITY: ICD-10-CM

## 2025-05-09 DIAGNOSIS — I10 PRIMARY HYPERTENSION: ICD-10-CM

## 2025-05-09 DIAGNOSIS — G47.33 OSA ON CPAP: ICD-10-CM

## 2025-05-09 DIAGNOSIS — E66.813 CLASS 3 SEVERE OBESITY DUE TO EXCESS CALORIES WITH SERIOUS COMORBIDITY AND BODY MASS INDEX (BMI) OF 50.0 TO 59.9 IN ADULT: ICD-10-CM

## 2025-05-09 LAB
ALBUMIN SERPL-MCNC: 4.4 G/DL (ref 3.2–4.8)
ALBUMIN/GLOB SERPL: 1.6 {RATIO} (ref 1–2)
ALP LIVER SERPL-CCNC: 54 U/L (ref 45–117)
ALT SERPL-CCNC: 78 U/L (ref 10–49)
ANION GAP SERPL CALC-SCNC: 12 MMOL/L (ref 0–18)
AST SERPL-CCNC: 35 U/L (ref ?–34)
BASOPHILS # BLD AUTO: 0.07 X10(3) UL (ref 0–0.2)
BASOPHILS NFR BLD AUTO: 1.3 %
BILIRUB SERPL-MCNC: 0.6 MG/DL (ref 0.3–1.2)
BUN BLD-MCNC: 23 MG/DL (ref 9–23)
CALCIUM BLD-MCNC: 9.6 MG/DL (ref 8.7–10.6)
CHLORIDE SERPL-SCNC: 107 MMOL/L (ref 98–112)
CHOLEST SERPL-MCNC: 200 MG/DL (ref ?–200)
CO2 SERPL-SCNC: 23 MMOL/L (ref 21–32)
CREAT BLD-MCNC: 1.25 MG/DL (ref 0.7–1.3)
DEPRECATED HBV CORE AB SER IA-ACNC: 82 NG/ML (ref 50–336)
EGFRCR SERPLBLD CKD-EPI 2021: 77 ML/MIN/1.73M2 (ref 60–?)
EOSINOPHIL # BLD AUTO: 0.28 X10(3) UL (ref 0–0.7)
EOSINOPHIL NFR BLD AUTO: 5.1 %
ERYTHROCYTE [DISTWIDTH] IN BLOOD BY AUTOMATED COUNT: 13.3 %
FASTING PATIENT LIPID ANSWER: YES
FASTING STATUS PATIENT QL REPORTED: YES
GLOBULIN PLAS-MCNC: 2.8 G/DL (ref 2–3.5)
GLUCOSE BLD-MCNC: 96 MG/DL (ref 70–99)
HCT VFR BLD AUTO: 45.3 % (ref 39–53)
HDLC SERPL-MCNC: 44 MG/DL (ref 40–59)
HGB BLD-MCNC: 15 G/DL (ref 13–17.5)
IMM GRANULOCYTES # BLD AUTO: 0.02 X10(3) UL (ref 0–1)
IMM GRANULOCYTES NFR BLD: 0.4 %
LDLC SERPL CALC-MCNC: 125 MG/DL (ref ?–100)
LYMPHOCYTES # BLD AUTO: 1.07 X10(3) UL (ref 1–4)
LYMPHOCYTES NFR BLD AUTO: 19.5 %
MAGNESIUM SERPL-MCNC: 1.8 MG/DL (ref 1.6–2.6)
MCH RBC QN AUTO: 30.1 PG (ref 26–34)
MCHC RBC AUTO-ENTMCNC: 33.1 G/DL (ref 31–37)
MCV RBC AUTO: 91 FL (ref 80–100)
MONOCYTES # BLD AUTO: 0.48 X10(3) UL (ref 0.1–1)
MONOCYTES NFR BLD AUTO: 8.7 %
NEUTROPHILS # BLD AUTO: 3.58 X10 (3) UL (ref 1.5–7.7)
NEUTROPHILS # BLD AUTO: 3.58 X10(3) UL (ref 1.5–7.7)
NEUTROPHILS NFR BLD AUTO: 65 %
NONHDLC SERPL-MCNC: 156 MG/DL (ref ?–130)
OSMOLALITY SERPL CALC.SUM OF ELEC: 298 MOSM/KG (ref 275–295)
PHOSPHATE SERPL-MCNC: 2.7 MG/DL (ref 2.4–5.1)
PLATELET # BLD AUTO: 234 10(3)UL (ref 150–450)
POTASSIUM SERPL-SCNC: 3.9 MMOL/L (ref 3.5–5.1)
PROT SERPL-MCNC: 7.2 G/DL (ref 5.7–8.2)
RBC # BLD AUTO: 4.98 X10(6)UL (ref 4.3–5.7)
SODIUM SERPL-SCNC: 142 MMOL/L (ref 136–145)
TRIGL SERPL-MCNC: 176 MG/DL (ref 30–149)
TSI SER-ACNC: 2.05 UIU/ML (ref 0.55–4.78)
VLDLC SERPL CALC-MCNC: 31 MG/DL (ref 0–30)
WBC # BLD AUTO: 5.5 X10(3) UL (ref 4–11)

## 2025-05-09 PROCEDURE — 83735 ASSAY OF MAGNESIUM: CPT | Performed by: FAMILY MEDICINE

## 2025-05-09 PROCEDURE — 80061 LIPID PANEL: CPT | Performed by: FAMILY MEDICINE

## 2025-05-09 PROCEDURE — 80050 GENERAL HEALTH PANEL: CPT | Performed by: FAMILY MEDICINE

## 2025-05-09 PROCEDURE — 84100 ASSAY OF PHOSPHORUS: CPT | Performed by: FAMILY MEDICINE

## 2025-05-09 PROCEDURE — 82728 ASSAY OF FERRITIN: CPT | Performed by: FAMILY MEDICINE

## 2025-05-09 NOTE — PATIENT INSTRUCTIONS
Health Screening Guidelines, Men Ages 18 to 39   Screening tests and health counseling are a key part of managing your health. A screening test is done to find disorders or diseases in people who don't have any symptoms. Screening tests are not used to diagnose. They are used to find out if more testing is needed. The goal may be to find a disease early so it can be treated with more success. Or the goal may be to find a disease early so you can make lifestyle changes. You may need regular checkups to help you reduce your risk of disease.   Below are guidelines for men ages 18 to 39. Talk with your health care provider. Make sure you’re up-to-date on what you need.   We understand gender is a spectrum. We may use gendered terms to talk about anatomy and health risk. Please use this information in a way that works best for you and your provider as you talk about your care.   Screening  Who needs it  How often    Alcohol misuse All men in this age group  At routine exams   Blood pressure All men in this age group  Once a year if your blood pressure is normal. Normal blood pressure is less than 120/80 mm Hg. If your blood pressure is higher than this, follow the advice of your health care provider.    Prediabetes and type 2 diabetes  All adults begin screening at age 35.  At least every 3 years (yearly if blood sugar has already started to rise)    Hepatitis C All men ages 18 to 79  At routine exams   High cholesterol or triglycerides  All men ages 20 and older, and younger men at high risk for coronary artery disease.  At least every 5 years    HIV All men At routine exams   Obesity All men in this age group  At routine exams   Syphilis Men at higher risk for infection. Talk with your health care provider.  At routine exams   Tuberculosis Men at higher risk for infection. Talk with your health care provider.  Ask your health care provider    Vision All men in this age group  Every 5 to 10 years if no risk factors  for eye disease    Health counseling  Who needs it  How often    Diet and exercise All men in this age group  At routine exams   Use of tobacco and the health effects it can cause  All men in this age group  Every visit   Sexually transmitted infection prevention  Men who are sexually active  At routine exams   Skin cancer All men in this age group  At routine exams. You may be reminded to stay away from outdoor tanning and tanning beds.    Nick last reviewed this educational content on 1/1/2025  This information is for informational purposes only. This is not intended to be a substitute for professional medical advice, diagnosis, or treatment. Always seek the advice and follow the directions from your physician or other qualified health care provider.  © 1218-2859 [employer]. All rights reserved. This information is not intended as a substitute for professional medical care. Always follow your healthcare professional's instructions.

## 2025-05-09 NOTE — PROGRESS NOTES
The following individual(s) verbally consented to be recorded using ambient AI listening technology and understand that they can each withdraw their consent to this listening technology at any point by asking the clinician to turn off or pause the recording:    Patient name: Lakhwinder GLENN LeivaMa  Additional names:

## 2025-05-09 NOTE — PROGRESS NOTES
Subjective:   Lakhwinder Ma is a 34 year old male who presents for Physical, Hypertension, and Follow - Up (6 month )       History/Other:   History of Present Illness  Lakhwinder Ma is a 34 year old male with obesity and sleep apnea who presents for a six-month follow-up and annual physical exam.    He has no significant concerns at this time. His medication, Zepbound, was not approved, and he is in contact with the weight loss clinic for further follow-up in June or July. He has been using a CPAP machine for his sleep apnea.    He experiences leg cramps when starting to walk after a sedentary winter, occurring after walking shelter around the block. He attributes this to possibly pushing himself too hard but notes he was able to mow the lawn without issues. He does not track his water intake but drinks when thirsty.    His mental health status is stable with no recent changes to psychiatric medications. He has gained weight according to a different scale at the weight loss clinic, but his weight is stable compared to the last visit on the current scale. He is monitoring his salt intake.    He experiences occasional nausea with his morning dose of Naltrexone, which he takes with food. Consistency with the medication has improved the nausea, although it persists slightly.    He enjoys his new job and had a good winter, including a trip to [place] over Christmas. He is trying to incorporate more walking into his routine and is working with his therapist on strategies to improve his exercise commitment. He has some exercise equipment at home but does not belong to a gym. No vision or hearing issues, rashes, moles, lesions, nausea, vomiting, diarrhea, or constipation. He confirms using a CPAP machine for sleep apnea.     Chief Complaint Reviewed and Verified  Nursing Notes Reviewed and   Verified  Allergies Reviewed  Medications Reviewed         Tobacco:  He has never smoked tobacco.    Current  Medications[1]    Review of Systems:  Pertinent items are noted in HPI.    Objective:   /88   Pulse 94   Resp 20   Ht 6' 2.5\" (1.892 m)   Wt (!) 431 lb (195.5 kg)   SpO2 95%   BMI 54.60 kg/m²  Estimated body mass index is 54.6 kg/m² as calculated from the following:    Height as of this encounter: 6' 2.5\" (1.892 m).    Weight as of this encounter: 431 lb (195.5 kg).  Physical Exam  GENERAL: Alert, cooperative, well developed, no acute distress, obese  HEENT: Normocephalic, normal oropharynx, moist mucous membranes.  CHEST: Clear to auscultation bilaterally, no wheezes, rhonchi, or crackles.  CARDIOVASCULAR: Normal heart rate and rhythm, S1 and S2 normal without murmurs.  ABDOMEN: Soft, non-tender, non-distended, without organomegaly, normal bowel sounds.  EXTREMITIES: No cyanosis or edema.  MUSCULOSKELETAL: Spine, arms, and legs with normal range of motion, no tenderness. Strength 5/5 wnl. Normal gait.   NEUROLOGICAL: Cranial nerves grossly intact, moves all extremities without gross motor or sensory deficit, gait normal.    Assessment & Plan:   1. Wellness examination (Primary)  -Immunizations: UTD   -Metabolic: BMI ___54_. BP wnl. Due for annual labs   -Communicable disease: low risk   -Mental health: no concerns   -Other preventative: follow with dentistry and optometry.   -Lifestyle: Follow a well balanced healthy diet with emphasis on fruits, vegetables, whole grains, lean meats. Limit processed and junk foods. Aim for at least 150 minutes of moderate intensity exercise weekly. Make sure you are staying adequately hydrated. Aim to get 7-9 hours of sleep nightly.     2. Laboratory examination ordered as part of a routine general medical examination  -     CBC With Differential With Platelet; Future; Expected date: 05/09/2025  -     Comp Metabolic Panel (14); Future; Expected date: 05/09/2025  -     Lipid Panel; Future; Expected date: 05/09/2025  -     TSH W Reflex To Free T4; Future; Expected date:  05/09/2025    3. Current moderate episode of major depressive disorder without prior episode (HCC)  Stable, following with psych. CPM.     4. Primary hypertension  Improving, CPM. Continue low sodium det, regular exercise.     5. DAISHA on CPAP  Stable, continue present management.     6. Class 3 severe obesity due to excess calories with serious comorbidity and body mass index (BMI) of 50.0 to 59.9 in adult  Following with wt loss clinic. Zepbound not approved.   Reviewed dietary/exercise recommendations.     7. Cramps, extremity  Increase water intake, incorporate more stretching. Will check electrolytes.     -     Magnesium; Future; Expected date: 05/09/2025  -     Phosphorus; Future; Expected date: 05/09/2025  -     Ferritin; Future; Expected date: 05/09/2025      Return in about 6 months (around 11/9/2025).        Arben Mckinney MD, 5/9/2025, 9:00 AM             [1]   Current Outpatient Medications   Medication Sig Dispense Refill    naltrexone 50 MG Oral Tab Take 1 tablet (50 mg total) by mouth daily with food. For cravings 90 tablet 1    topiramate 50 MG Oral Tab Take 1 tablet (50 mg total) by mouth 2 (two) times daily. 60 tablet 2    ARIPiprazole 2 MG Oral Tab Take 1 tablet (2 mg total) by mouth daily. 90 tablet 1    buPROPion HCl ER, SR, 200 MG Oral Tablet 12 Hr Take 1 tablet (200 mg total) by mouth 2 (two) times daily. 180 tablet 1    clonazePAM 1 MG Oral Tab Take 1 tablet (1 mg total) by mouth 2 (two) times daily. 180 tablet 1    lisinopril-hydroCHLOROthiazide 20-12.5 MG Oral Tab Take 1 tablet by mouth daily. 90 tablet 1    Omega-3 Fatty Acids (FISH OIL OR) Take 1 capsule by mouth daily.      hydrOXYzine 25 MG Oral Tab Take 1 tablet (25 mg total) by mouth as needed for Anxiety.      naproxen 500 MG Oral Tab Take 1 tablet (500 mg total) by mouth as needed.      albuterol 108 (90 Base) MCG/ACT Inhalation Aero Soln Inhale 2 puffs into the lungs every 4 (four) hours as needed for Wheezing or Shortness of  Breath.      loratadine 10 MG Oral Tab Take 1 tablet (10 mg total) by mouth daily.      Multiple Vitamin (MULTIVITAMIN ADULT) Oral Tab Take 1 tablet by mouth daily.

## 2025-07-01 DIAGNOSIS — I10 ESSENTIAL HYPERTENSION: ICD-10-CM

## 2025-07-04 RX ORDER — LISINOPRIL AND HYDROCHLOROTHIAZIDE 12.5; 2 MG/1; MG/1
1 TABLET ORAL DAILY
Qty: 90 TABLET | Refills: 3 | Status: SHIPPED | OUTPATIENT
Start: 2025-07-04

## 2025-07-04 NOTE — TELEPHONE ENCOUNTER
Refill Per Protocol     Requested Prescriptions   Pending Prescriptions Disp Refills    LISINOPRIL-HYDROCHLOROTHIAZIDE 20-12.5 MG Oral Tab [Pharmacy Med Name: LISINOPRIL-HCTZ 20/12.5MG TABLETS] 90 tablet 1     Sig: Take 1 tablet by mouth daily.       Hypertension Medications Protocol Passed - 7/4/2025  8:01 AM        Passed - CMP or BMP in past 12 months        Passed - Last BP reading less than 140/90     BP Readings from Last 1 Encounters:   05/09/25 132/88               Passed - In person appointment or virtual visit in the past 12 mos or appointment in next 3 mos     Recent Outpatient Visits              1 month ago Wellness examination    [address] Arben Mckinney MD    Office Visit    1 month ago DAISHA on CPAP    [address] Monique Nguyen APRN    Telemedicine    5 months ago Primary hypertension    [address] Monique Nguyen APRN    Office Visit    5 months ago DAISHA (obstructive sleep apnea)    [facility]    Office Visit    7 months ago Chronic midline low back pain without sciatica    [address] Tito Carreno PA-C    Office Visit          Future Appointments         Provider Department Appt Notes    In 1 month Juana Sevilla APN [address]     In 3 months Monique Nguyen APRN [address]     In 4 months Arben Mckinney MD [address] 6 month f/u                    Passed - EGFRCR or GFRNAA > 50     GFR Evaluation  EGFRCR: 77 , resulted on 5/9/2025          Passed - Medication is active on med list

## 2025-08-06 ENCOUNTER — TELEMEDICINE (OUTPATIENT)
Dept: INTERNAL MEDICINE CLINIC | Facility: CLINIC | Age: 35
End: 2025-08-06

## 2025-08-06 DIAGNOSIS — F32.A ANXIETY AND DEPRESSION: ICD-10-CM

## 2025-08-06 DIAGNOSIS — E66.813 CLASS 3 SEVERE OBESITY WITH SERIOUS COMORBIDITY AND BODY MASS INDEX (BMI) OF 50.0 TO 59.9 IN ADULT, UNSPECIFIED OBESITY TYPE: ICD-10-CM

## 2025-08-06 DIAGNOSIS — E88.819 INSULIN RESISTANCE: ICD-10-CM

## 2025-08-06 DIAGNOSIS — R63.2 BINGE EATING: ICD-10-CM

## 2025-08-06 DIAGNOSIS — F43.9 STRESS: ICD-10-CM

## 2025-08-06 DIAGNOSIS — Z51.81 ENCOUNTER FOR THERAPEUTIC DRUG MONITORING: Primary | ICD-10-CM

## 2025-08-06 DIAGNOSIS — G47.33 OSA ON CPAP: ICD-10-CM

## 2025-08-06 DIAGNOSIS — I10 PRIMARY HYPERTENSION: ICD-10-CM

## 2025-08-06 DIAGNOSIS — F41.9 ANXIETY AND DEPRESSION: ICD-10-CM

## 2025-08-06 PROBLEM — F90.2 ATTENTION DEFICIT HYPERACTIVITY DISORDER (ADHD), COMBINED TYPE: Status: RESOLVED | Noted: 2023-08-21 | Resolved: 2025-08-06

## 2025-08-06 PROCEDURE — 98006 SYNCH AUDIO-VIDEO EST MOD 30: CPT | Performed by: NURSE PRACTITIONER

## 2025-08-06 RX ORDER — LISDEXAMFETAMINE DIMESYLATE 30 MG/1
30 CAPSULE ORAL DAILY
Qty: 30 CAPSULE | Refills: 0 | Status: SHIPPED | OUTPATIENT
Start: 2025-08-06 | End: 2025-09-05

## 2025-08-06 RX ORDER — TOPIRAMATE 50 MG/1
50 TABLET, FILM COATED ORAL 2 TIMES DAILY
Qty: 60 TABLET | Refills: 0 | Status: SHIPPED | OUTPATIENT
Start: 2025-08-06 | End: 2025-09-05

## 2025-08-06 RX ORDER — LISDEXAMFETAMINE DIMESYLATE 30 MG/1
30 CAPSULE ORAL DAILY
Qty: 30 CAPSULE | Refills: 0 | Status: SHIPPED | OUTPATIENT
Start: 2025-10-07 | End: 2025-11-06

## 2025-08-06 RX ORDER — LISDEXAMFETAMINE DIMESYLATE 30 MG/1
30 CAPSULE ORAL DAILY
Qty: 30 CAPSULE | Refills: 0 | Status: SHIPPED | OUTPATIENT
Start: 2025-09-06 | End: 2025-10-06

## 2025-08-19 ENCOUNTER — LAB ENCOUNTER (OUTPATIENT)
Dept: LAB | Age: 35
End: 2025-08-19

## 2025-08-19 ENCOUNTER — OFFICE VISIT (OUTPATIENT)
Dept: FAMILY MEDICINE CLINIC | Facility: CLINIC | Age: 35
End: 2025-08-19

## 2025-08-19 ENCOUNTER — HOSPITAL ENCOUNTER (OUTPATIENT)
Dept: ULTRASOUND IMAGING | Age: 35
Discharge: HOME | End: 2025-08-19

## 2025-08-19 VITALS
HEIGHT: 74.5 IN | HEART RATE: 96 BPM | SYSTOLIC BLOOD PRESSURE: 130 MMHG | RESPIRATION RATE: 16 BRPM | DIASTOLIC BLOOD PRESSURE: 90 MMHG | OXYGEN SATURATION: 96 % | BODY MASS INDEX: 40 KG/M2 | WEIGHT: 315 LBS

## 2025-08-19 DIAGNOSIS — N50.89 MASS OF SCROTUM: ICD-10-CM

## 2025-08-19 DIAGNOSIS — I10 ESSENTIAL HYPERTENSION: ICD-10-CM

## 2025-08-19 DIAGNOSIS — N50.89 MASS OF SCROTUM: Primary | ICD-10-CM

## 2025-08-19 LAB
AFP-TM SERPL-MCNC: 2.2 NG/ML (ref ?–8)
ANION GAP SERPL CALC-SCNC: 8 MMOL/L (ref 0–18)
BASOPHILS # BLD AUTO: 0.03 X10(3) UL (ref 0–0.2)
BASOPHILS NFR BLD AUTO: 0.5 %
BUN BLD-MCNC: 15 MG/DL (ref 9–23)
CALCIUM BLD-MCNC: 9.6 MG/DL (ref 8.7–10.6)
CHLORIDE SERPL-SCNC: 109 MMOL/L (ref 98–112)
CO2 SERPL-SCNC: 24 MMOL/L (ref 21–32)
CREAT BLD-MCNC: 1.24 MG/DL (ref 0.7–1.3)
EGFRCR SERPLBLD CKD-EPI 2021: 77 ML/MIN/1.73M2 (ref 60–?)
EOSINOPHIL # BLD AUTO: 0.24 X10(3) UL (ref 0–0.7)
EOSINOPHIL NFR BLD AUTO: 4.3 %
ERYTHROCYTE [DISTWIDTH] IN BLOOD BY AUTOMATED COUNT: 13.2 %
FASTING STATUS PATIENT QL REPORTED: YES
GLUCOSE BLD-MCNC: 93 MG/DL (ref 70–99)
HBV SURFACE AB SER QL: REACTIVE
HBV SURFACE AB SERPL IA-ACNC: 20.96 MIU/ML
HBV SURFACE AG SER-ACNC: <0.1
HBV SURFACE AG SERPL QL IA: NONREACTIVE
HCT VFR BLD AUTO: 43.4 % (ref 39–53)
HCV AB SERPL QL IA: NONREACTIVE
HGB BLD-MCNC: 14.3 G/DL (ref 13–17.5)
IMM GRANULOCYTES # BLD AUTO: 0.02 X10(3) UL (ref 0–1)
IMM GRANULOCYTES NFR BLD: 0.4 %
LYMPHOCYTES # BLD AUTO: 0.89 X10(3) UL (ref 1–4)
LYMPHOCYTES NFR BLD AUTO: 15.9 %
MCH RBC QN AUTO: 30.2 PG (ref 26–34)
MCHC RBC AUTO-ENTMCNC: 32.9 G/DL (ref 31–37)
MCV RBC AUTO: 91.6 FL (ref 80–100)
MONOCYTES # BLD AUTO: 0.48 X10(3) UL (ref 0.1–1)
MONOCYTES NFR BLD AUTO: 8.6 %
NEUTROPHILS # BLD AUTO: 3.92 X10 (3) UL (ref 1.5–7.7)
NEUTROPHILS # BLD AUTO: 3.92 X10(3) UL (ref 1.5–7.7)
NEUTROPHILS NFR BLD AUTO: 70.3 %
OSMOLALITY SERPL CALC.SUM OF ELEC: 293 MOSM/KG (ref 275–295)
PLATELET # BLD AUTO: 246 10(3)UL (ref 150–450)
POTASSIUM SERPL-SCNC: 3.8 MMOL/L (ref 3.5–5.1)
RBC # BLD AUTO: 4.74 X10(6)UL (ref 4.3–5.7)
SODIUM SERPL-SCNC: 141 MMOL/L (ref 136–145)
T PALLIDUM AB SER QL IA: NONREACTIVE
WBC # BLD AUTO: 5.6 X10(3) UL (ref 4–11)

## 2025-08-19 PROCEDURE — 99214 OFFICE O/P EST MOD 30 MIN: CPT

## 2025-08-19 PROCEDURE — 3080F DIAST BP >= 90 MM HG: CPT

## 2025-08-19 PROCEDURE — 87491 CHLMYD TRACH DNA AMP PROBE: CPT

## 2025-08-19 PROCEDURE — 93975 VASCULAR STUDY: CPT

## 2025-08-19 PROCEDURE — 86780 TREPONEMA PALLIDUM: CPT

## 2025-08-19 PROCEDURE — 87591 N.GONORRHOEAE DNA AMP PROB: CPT

## 2025-08-19 PROCEDURE — 80048 BASIC METABOLIC PNL TOTAL CA: CPT

## 2025-08-19 PROCEDURE — 85025 COMPLETE CBC W/AUTO DIFF WBC: CPT

## 2025-08-19 PROCEDURE — 84704 HCG FREE BETACHAIN TEST: CPT

## 2025-08-19 PROCEDURE — 82105 ALPHA-FETOPROTEIN SERUM: CPT

## 2025-08-19 PROCEDURE — 87340 HEPATITIS B SURFACE AG IA: CPT

## 2025-08-19 PROCEDURE — 87389 HIV-1 AG W/HIV-1&-2 AB AG IA: CPT

## 2025-08-19 PROCEDURE — 76870 US EXAM SCROTUM: CPT

## 2025-08-19 PROCEDURE — 3008F BODY MASS INDEX DOCD: CPT

## 2025-08-19 PROCEDURE — 86803 HEPATITIS C AB TEST: CPT

## 2025-08-19 PROCEDURE — 86706 HEP B SURFACE ANTIBODY: CPT

## 2025-08-19 PROCEDURE — 3075F SYST BP GE 130 - 139MM HG: CPT

## 2025-08-20 LAB
C TRACH DNA SPEC QL NAA+PROBE: NEGATIVE
HCG BETA SUBUNIT TUMOR MARKER QN: <1 MIU/ML
N GONORRHOEA DNA SPEC QL NAA+PROBE: NEGATIVE

## 2025-08-21 ENCOUNTER — OFFICE VISIT (OUTPATIENT)
Facility: LOCATION | Age: 35
End: 2025-08-21

## 2025-08-21 DIAGNOSIS — N50.89 SCROTAL MASS: Primary | ICD-10-CM

## 2025-08-21 DIAGNOSIS — N50.82 SCROTAL PAIN: ICD-10-CM

## 2025-08-21 PROCEDURE — 99203 OFFICE O/P NEW LOW 30 MIN: CPT

## 2025-08-21 RX ORDER — SULFAMETHOXAZOLE AND TRIMETHOPRIM 800; 160 MG/1; MG/1
1 TABLET ORAL 2 TIMES DAILY
Qty: 20 TABLET | Refills: 0 | Status: SHIPPED | OUTPATIENT
Start: 2025-08-21 | End: 2025-08-31

## 2025-08-29 ENCOUNTER — OFFICE VISIT (OUTPATIENT)
Facility: LOCATION | Age: 35
End: 2025-08-29

## 2025-08-29 DIAGNOSIS — N50.82 SCROTAL PAIN: ICD-10-CM

## 2025-08-29 DIAGNOSIS — N50.89 SCROTAL MASS: Primary | ICD-10-CM

## 2025-08-29 PROCEDURE — 99212 OFFICE O/P EST SF 10 MIN: CPT

## (undated) NOTE — Clinical Note
Thank you for referring Betsy Rice to the [facility]. I met with him in consultation today via person. I have ordered labs, referred for a nutrition consultation with our dietician. He was started on naltrexone and Metformin ER for medication therapy and will follow-up with me in about 8 weeks.

## (undated) NOTE — LETTER
ASTHMA ACTION PLAN for Lakhwinder Ma     : 1990     Date: 2024  Provider:  Arben Mckinney MD  Phone for doctor or clinic: [facility], [address], [place]  [address] [address]  [facility] [id number] [phone]    ACT Score: 23      You can use the colors of a traffic light to help learn about your asthma medicines.      1. Green - Go! % of Personal Best Peak Flow Use controller medicine.   Breathing is good  No cough or wheeze  Can work and play Medicine How much to take When to take it    albuterol 108 (90 Base) MCG/ACT Inhalation Aero Soln    Inhale 2 puffs into the lungs every 4 (four) hours as needed for Wheezing or Shortness of Breath       2. Yellow - Caution. 50-79% Personal Best Peak  Flow.  Use reliever medicine to keep an asthma attack from getting bad.   Cough  Wheezing  Tight Chest  Wake up at night Medicine How much to take When to take it    Call our office in event of an increased use of rescue inhaler to make an appointment in order to prevent red zone symptoms.          Additional instructions         3. Red - Stop! Danger!  <50% Personal Best Peak  Flow. Take these medications until  Get help from a doctor   Medicine not helping  Breathing is hard and fast  Nose opens wide  Can't walk  Ribs show  Can't talk well Medicine How much to take When to take it    Call 911, or go to the emergency room immediately! Take Albuterol every 15 minutes until you have received medical attention.         Additional Instructions If your symptoms do not improve and you cannot contact your doctor, [facility] or call 911 immediately!     [x] Asthma Action Plan reviewed with patient (and caregiver if necessary) and a copy of the plan was given to the patient/caregiver.   [] Asthma Action Plan reviewed with patient (and caregiver if necessary) on the phone and mailed copy to patient or submitted via [employer].     Signatures:  Provider  Arben Poon  MD Hank   Patient Caretaker